# Patient Record
Sex: FEMALE | Race: WHITE | NOT HISPANIC OR LATINO | Employment: FULL TIME | URBAN - METROPOLITAN AREA
[De-identification: names, ages, dates, MRNs, and addresses within clinical notes are randomized per-mention and may not be internally consistent; named-entity substitution may affect disease eponyms.]

---

## 2018-05-30 ENCOUNTER — OFFICE VISIT (OUTPATIENT)
Dept: FAMILY MEDICINE | Facility: CLINIC | Age: 43
End: 2018-05-30
Payer: COMMERCIAL

## 2018-05-30 ENCOUNTER — VIRTUAL VISIT (OUTPATIENT)
Dept: INTERPRETER SERVICES | Facility: CLINIC | Age: 43
End: 2018-05-30

## 2018-05-30 VITALS
BODY MASS INDEX: 36.82 KG/M2 | TEMPERATURE: 98.3 F | HEART RATE: 85 BPM | RESPIRATION RATE: 18 BRPM | HEIGHT: 63 IN | SYSTOLIC BLOOD PRESSURE: 164 MMHG | OXYGEN SATURATION: 99 % | WEIGHT: 207.8 LBS | DIASTOLIC BLOOD PRESSURE: 118 MMHG

## 2018-05-30 DIAGNOSIS — R07.0 THROAT PAIN: Primary | ICD-10-CM

## 2018-05-30 DIAGNOSIS — R03.0 ELEVATED BLOOD PRESSURE READING WITHOUT DIAGNOSIS OF HYPERTENSION: ICD-10-CM

## 2018-05-30 DIAGNOSIS — J02.0 ACUTE STREPTOCOCCAL PHARYNGITIS: ICD-10-CM

## 2018-05-30 DIAGNOSIS — J30.1 ACUTE ALLERGIC RHINITIS DUE TO POLLEN, UNSPECIFIED SEASONALITY: ICD-10-CM

## 2018-05-30 LAB
DEPRECATED S PYO AG THROAT QL EIA: ABNORMAL
SPECIMEN SOURCE: ABNORMAL

## 2018-05-30 PROCEDURE — 99203 OFFICE O/P NEW LOW 30 MIN: CPT | Performed by: FAMILY MEDICINE

## 2018-05-30 PROCEDURE — 87880 STREP A ASSAY W/OPTIC: CPT | Performed by: FAMILY MEDICINE

## 2018-05-30 RX ORDER — FLUTICASONE PROPIONATE 50 MCG
1-2 SPRAY, SUSPENSION (ML) NASAL DAILY
Qty: 1 BOTTLE | Refills: 11 | Status: SHIPPED | OUTPATIENT
Start: 2018-05-30 | End: 2018-12-28

## 2018-05-30 RX ORDER — AMOXICILLIN 500 MG/1
500 CAPSULE ORAL 3 TIMES DAILY
Qty: 30 CAPSULE | Refills: 0 | Status: SHIPPED | OUTPATIENT
Start: 2018-05-30 | End: 2019-02-01

## 2018-05-30 ASSESSMENT — PAIN SCALES - GENERAL: PAINLEVEL: NO PAIN (0)

## 2018-05-30 NOTE — MR AVS SNAPSHOT
"              After Visit Summary   5/30/2018    Charity Guo    MRN: [id number]           Patient Information     Date Of Birth          1975        Visit Information        Provider Department      5/30/2018 3:20 PM Eloise Cm MD; PHONE,  [facility]        Today's Diagnoses     Throat pain    -  1    Elevated blood pressure reading without diagnosis of hypertension        Acute allergic rhinitis due to pollen, unspecified seasonality        Acute streptococcal pharyngitis          Care Instructions      Rinitis alérgica  La rinitis alérgica es alfredo reacción alérgica que afecta la nariz y, con frecuencia, los ojos. Se la suele conocer alex alergias nasales. Es frecuente que las alergias nasales se deban a elementos que están presentes en el medioambiente y se respiran. Según a qué sea sensible, las alergias nasales pueden presentarse únicamente mady ciertas estaciones. O también pueden ocurrir mady todo el año. Los alérgenos comunes de interior incluyen los ácaros del polvo, el moho, las cucarachas y la caspa de las mascotas. Los alérgenos de exterior incluyen el polen de los árboles, los pastos y las hierbas.  Los síntomas son, por ejemplo, goteo nasal, congestión y comezón en la nariz. También incluyen estornudos, ojos rojos y con comezón, y areolas oscuras (\"ojeras alérgicas\") debajo de los ojos. Además, usted puede estar irritable y cansado. Las alergias jens también pueden afectar la respiración y desencadenar alfredo afección llamada asma.  Se pueden realizar pruebas para detectar a qué alérgenos reacciona usted. Es posible que le remitan a un especialista en alergias para que le evalúe y le pete pruebas.  Cuidados en lopez casa  Probablemente el proveedor de atención médica le recete medicamentos para ayudar a aliviar los síntomas de alergia. Siga las instrucciones para jero estos medicamentos. Pueden incluir medicamentos orales, esprays nasales o " "gotas para los ojos.  Pida consejo al proveedor sobre cómo evitar las sustancias a las que es alérgico. Los siguientes son algunos consejos para cada tipo de alérgeno.  Caspa de mascota:    No tenga mascotas con pelaje o plumas.    Si no puede evitar tenerlas, manténgalas fuera del dormitorio y de los muebles tapizados.  Polen:    Cuando el nivel de polen en el ambiente está alto, mantenga cerradas las ventanas de lopez casa y lopez automóvil. De ser posible, use en cambio el aire acondicionado.    Use alfredo mascarilla con filtro cuando kedar el césped o trabaje en el jardín.  Ácaros del polvo en la casa:    Lave la ropa de cama todas las semanas con [place] y detergente, y séquela en un ambiente caliente.    Cubra el colchón, el somier y las almohadas con fundas antialérgicas.    Si es posible, duerma en un cuarto que no tenga tapete, adeola ni muebles tapizados.  Cucarachas:    Guarde la comida en recipientes cerrados herméticamente.    Saque la basura de lopez casa rápidamente.    Arregle las filtraciones de agua.  Moho:    Mantenga bajo el nivel de humedad usando un deshumidificador o el aparato de aire acondicionado. Mantenga el deshumidificador y el aire acondicionado limpios y sin moho.    Limpie áreas que tengan moho con agua y blanqueador.  En general:    Pase la aspiradora alfredo o dos veces por semana. Si es posible, use alfredo aspiradora que tenga un filtro de aire de jose alfredo eficiencia para partículas (\"HEPA\", por matt siglas en inglés).    No fume. Evite el humo de cigarrillo. Fely humo es irritante y puede empeorar los síntomas.  Visitas de control  Programe alfredo visita de control según le indique el proveedor de atención médica o nuestro personal. Si le remitieron a un especialista en alergias, coordine eliza mary sin demoras.  Cuándo debe buscar atención médica  Llame enseguida a lopez proveedor de atención médica si se presenta cualquiera de las siguientes situaciones:    Tos o silbidos al respirar    Fiebre superior " a 100.4  F (38  C)    Urticaria (bultos rojizos)    Continuidad de los síntomas, síntomas nuevos o empeoramiento de los síntomas  Llame al 911 de inmediato si presenta los siguientes síntomas:    Dificultad para respirar    Hinchazón grave de la marco o comezón intensa de los ojos o la boca   Date Last Reviewed: 10/22/2017    1403-3056 [employer]. [address] [place] [id number]. Todos los derechos reservados. Esta información no pretende sustituir la atención médica profesional. Sólo lopez médico puede diagnosticar y tratar un problema de alida.        Controlling High Blood Pressure  High blood pressure (hypertension) is often called the silent killer. This is because many people who have it don t know it. High blood pressure can raise your risk of heart attack, stroke, and heart failure. Controlling your blood pressure can decrease your risk of these problems. Know your blood pressure and remember to check it regularly. Doing so can save your life.  Blood pressure measurements are given as 2 numbers. Systolic blood pressure is the upper number. This is the pressure when the heart contracts. Diastolic blood pressure is the lower number. This is the pressure when the heart relaxes between beats.  Blood pressure is categorized as normal, elevated, or stage 1 or stage 2 high blood pressure:    Normal blood pressure is systolic of less than 120 and diastolic of less than 80 (120/80)    Elevated blood pressure is systolic of 120 to 129 and diastolic less than 80    Stage 1 high blood pressure is systolic is 130 to 139 or diastolic between 80 to 89    Stage 2 high blood pressure is when systolic is 140 or higher or the diastolic is 90 or higher  Here are some things you can do to help control your blood pressure.    Choose heart-healthy foods    Select low-salt, low-fat foods. Limit sodium intake to 2,400 mg per day or the amount suggested by your healthcare provider.    Limit canned, dried, cured,  packaged, and fast foods. These can contain a lot of salt.    Eat 8 to 10 servings of fruits and vegetables every day.    Choose lean meats, fish, or chicken.    Eat whole-grain pasta, brown rice, and beans.    Eat 2 to 3 servings of low-fat or fat-free dairy products.    Ask your doctor about the DASH eating plan. This plan helps reduce blood pressure.    When you go to a restaurant, ask that your meal be prepared with no added salt.  Maintain a healthy weight    Ask your healthcare provider how many calories to eat a day. Then stick to that number.    Ask your healthcare provider what weight range is healthiest for you. If you are overweight, a weight loss of only 3% to 5% of your body weight can help lower blood pressure. Generally, a good weight loss goal is to lose 10% of your body weight in a year.    Limit snacks and sweets.    Get regular exercise.  Get up and get active    Choose activities you enjoy. Find ones you can do with friends or family. This includes bicycling, dancing, walking, and jogging.    Park farther away from building entrances.    Use stairs instead of the elevator.    When you can, walk or bike instead of driving.    [place] leaves, garden, or do household repairs.    Be active at a moderate to vigorous level of physical activity for at least 40 minutes for a minimum of 3 to 4 days a week.   Manage stress    Make time to relax and enjoy life. Find time to laugh.    Communicate your concerns with your loved ones and your healthcare provider.    Visit with family and friends, and keep up with hobbies.  Limit alcohol and quit smoking    Men should have no more than 2 drinks per day.    Women should have no more than 1 drink per day.    Talk with your healthcare provider about quitting smoking. Smoking significantly increases your risk for heart disease and stroke. Ask your healthcare provider about community smoking cessation programs and other options.  Medicines  If lifestyle changes aren t  "enough, your healthcare provider may prescribe high blood pressure medicine. Take all medicines as prescribed. If you have any questions about your medicines, ask your healthcare provider before stopping or changing them.   Date Last Reviewed: 2016-2017 [employer]. [address] [place] [address]. All rights reserved. This information is not intended as a substitute for professional medical care. Always follow your healthcare professional's instructions.                Follow-ups after your visit        Who to contact     If you have questions or need follow up information about today's clinic visit or your schedule please contact [facility] directly at [phone].  Normal or non-critical lab and imaging results will be communicated to you by MyChart, letter or phone within 4 business days after the clinic has received the results. If you do not hear from us within 7 days, please contact the clinic through [employer] or phone. If you have a critical or abnormal lab result, we will notify you by phone as soon as possible.  Submit refill requests through [employer] or call your pharmacy and they will forward the refill request to us. Please allow 3 business days for your refill to be completed.          Additional Information About Your Visit        MyChart Information     [employer] lets you send messages to your doctor, view your test results, renew your prescriptions, schedule appointments and more. To sign up, go to [email or web address] . Click on \"Log in\" on the left side of the screen, which will take you to the Welcome page. Then click on \"Sign up Now\" on the right side of the page.     You will be asked to enter the access code listed below, as well as some personal information. Please follow the directions to create your username and password.     Your access code is: [email or web address]  Expires: 2018  4:12 PM     Your access code will  in 90 days. If " "you need help or a new code, please call your [facility] clinic or [phone].        Care EveryWhere ID     This is your Care EveryWhere ID. This could be used by other organizations to access your [facility] medical records  [id number]        Your Vitals Were     Pulse Temperature Respirations Height Last Period Pulse Oximetry    85 98.3  F (36.8  C) (Oral) 18 5' 2.6\" (1.59 m) 05/23/2018 (Exact Date) 99%    Breastfeeding? BMI (Body Mass Index)                No 37.28 kg/m2           Blood Pressure from Last 3 Encounters:   05/30/18 (!) 164/118    Weight from Last 3 Encounters:   05/30/18 207 lb 12.8 oz (94.3 kg)              We Performed the Following     Rapid strep screen          Today's Medication Changes          These changes are accurate as of 5/30/18  4:12 PM.  If you have any questions, ask your nurse or doctor.               Start taking these medicines.        Dose/Directions    amoxicillin 500 MG capsule   Commonly known as:  AMOXIL   Used for:  Acute streptococcal pharyngitis   Started by:  Eloise Cm MD        Dose:  500 mg   Take 1 capsule (500 mg) by mouth 3 times daily for 10 days   Quantity:  30 capsule   Refills:  0       fluticasone 50 MCG/ACT spray   Commonly known as:  FLONASE   Used for:  Acute allergic rhinitis due to pollen, unspecified seasonality   Started by:  Eloise Cm MD        Dose:  1-2 spray   Spray 1-2 sprays into both nostrils daily   Quantity:  1 Bottle   Refills:  11            Where to get your medicines      These medications were sent to [facility]/pharmacy #[id number] - [place], [place] - [address]  [address] [address]     Phone:  [phone]     amoxicillin 500 MG capsule    fluticasone 50 MCG/ACT spray                Primary Care Provider Fax #    Physician No Ref-Primary [phone]       No address on file        Equal Access to Services     MILADIS COTTON AH: martha Mccain, " deepa olguinalcoty valdezfaith kandacein hayaan adeeg kharash la'aan ah. So [facility] [phone].    ATENCIÓN: Si mayela mccollum, tiene a lopez disposición servicios gratuitos de asistencia lingüística. Rogelio al [phone].    We comply with applicable federal civil rights laws and [place] laws. We do not discriminate on the basis of race, color, national origin, age, disability, sex, sexual orientation, or gender identity.            Thank you!     Thank you for choosing [facility]  for your care. Our goal is always to provide you with excellent care. Hearing back from our patients is one way we can continue to improve our services. Please take a few minutes to complete the written survey that you may receive in the mail after your visit with us. Thank you!             Your Updated Medication List - Protect others around you: Learn how to safely use, store and throw away your medicines at www.disposemymeds.org.          This list is accurate as of 5/30/18  4:12 PM.  Always use your most recent med list.                   Brand Name Dispense Instructions for use Diagnosis    amoxicillin 500 MG capsule    AMOXIL    30 capsule    Take 1 capsule (500 mg) by mouth 3 times daily for 10 days    Acute streptococcal pharyngitis       fluticasone 50 MCG/ACT spray    FLONASE    1 Bottle    Spray 1-2 sprays into both nostrils daily    Acute allergic rhinitis due to pollen, unspecified seasonality

## 2018-05-30 NOTE — LETTER
May 30, 2018      Charity Guo  [address]  [place] [address]        To Whom It May Concern:    Charity Guo  was seen on 5/30/18.  Please excuse her until 6/1/18 due to illness.        Sincerely,        Eloise Stearns MD

## 2018-05-30 NOTE — PROGRESS NOTES
"  SUBJECTIVE:   Charity Guo is a 43 year old female who presents to clinic today for the following health issues:    RESPIRATORY SYMPTOMS      Duration: 6 days    Description  Sore throat, headache, pain in the middle back,pressure on chest, facial pressure, cough-dry cough    Severity: mild, at night its contunious    Accompanying signs and symptoms: chest pressure,fever,chills    History (predisposing factors):  none    Precipitating or alleviating factors: None    Therapies tried and outcome:  rest and fluids       Problem list and histories reviewed & adjusted, as indicated.  Additional history: as documented    Patient Active Problem List   Diagnosis     Elevated blood pressure reading without diagnosis of hypertension     History reviewed. No pertinent surgical history.    Social History   Substance Use Topics     Smoking status: Never Smoker     Smokeless tobacco: Never Used     Alcohol use No     Family History   Problem Relation Age of Onset     Hypertension Mother            Reviewed and updated as needed this visit by clinical staff  Tobacco  Allergies  Meds  Problems  Med Hx  Surg Hx  Fam Hx  Soc Hx        Reviewed and updated as needed this visit by Provider  Tobacco  Allergies  Meds  Problems  Med Hx  Surg Hx  Fam Hx  Soc Hx          ROS:  Constitutional, HEENT, cardiovascular, pulmonary, GI, , musculoskeletal, neuro, skin, endocrine and psych systems are negative, except as otherwise noted.    OBJECTIVE:     BP (!) 164/118 (BP Location: Left arm, Patient Position: Chair, Cuff Size: Adult Large)  Pulse 85  Temp 98.3  F (36.8  C) (Oral)  Resp 18  Ht 5' 2.6\" (1.59 m)  Wt 207 lb 12.8 oz (94.3 kg)  LMP 05/23/2018 (Exact Date)  SpO2 99%  Breastfeeding? No  BMI 37.28 kg/m2  Body mass index is 37.28 kg/(m^2).  GENERAL: healthy, alert and no distress  EYES: Eyes grossly normal to inspection, PERRL and conjunctivae and sclerae normal  HENT: normal cephalic/atraumatic, ear canals and " TM's normal, nasal mucosa edematous , oropharynx clear and oral mucous membranes moist  NECK: no adenopathy, no asymmetry, masses, or scars and thyroid normal to palpation  RESP: lungs clear to auscultation - no rales, rhonchi or wheezes  CV: regular rate and rhythm, normal S1 S2, no S3 or S4, no murmur, click or rub, no peripheral edema and peripheral pulses strong  ABDOMEN: soft, nontender, no hepatosplenomegaly, no masses and bowel sounds normal  MS: no gross musculoskeletal defects noted, no edema  SKIN: no suspicious lesions or rashes  NEURO: Normal strength and tone, mentation intact and speech normal  PSYCH: mentation appears normal, affect normal/bright    Diagnostic Test Results:  Results for orders placed or performed in visit on 05/30/18 (from the past 24 hour(s))   Rapid strep screen   Result Value Ref Range    Specimen Description Throat     Rapid Strep A Screen (A)      POSITIVE: Group A Streptococcal antigen detected by immunoassay.       ASSESSMENT/PLAN:     1. Throat pain    - Rapid strep screen    2. Elevated blood pressure reading without diagnosis of hypertension  Low sodium diet and exercise discussed.  Follow up 1 - 3 months if BP still elevated    3. Acute allergic rhinitis due to pollen, unspecified seasonality  Nasal steroid treatment  - fluticasone (FLONASE) 50 MCG/ACT spray; Spray 1-2 sprays into both nostrils daily  Dispense: 1 Bottle; Refill: 11    4. Acute streptococcal pharyngitis  Oral abx treatment. Work note given  - amoxicillin (AMOXIL) 500 MG capsule; Take 1 capsule (500 mg) by mouth 3 times daily for 10 days  Dispense: 30 capsule; Refill: 0    The uses and side effects, including black box warnings as appropriate, were discussed in detail.  All patient questions were answered.  The patient was instructed to call immediately if any side effects developed.     FUTURE APPOINTMENTS:       - Follow-up visit in 3 days if not improved.    Eloise Stearns MD  [facility]  [facility]

## 2018-05-30 NOTE — PATIENT INSTRUCTIONS
"  Rinitis alérgica  La rinitis alérgica es alfredo reacción alérgica que afecta la nariz y, con frecuencia, los ojos. Se la suele conocer alex alergias nasales. Es frecuente que las alergias nasales se deban a elementos que están presentes en el medioambiente y se respiran. Según a qué sea sensible, las alergias nasales pueden presentarse únicamente mady ciertas estaciones. O también pueden ocurrir mady todo el año. Los alérgenos comunes de interior incluyen los ácaros del polvo, el moho, las cucarachas y la caspa de las mascotas. Los alérgenos de exterior incluyen el polen de los árboles, los pastos y las hierbas.  Los síntomas son, por ejemplo, goteo nasal, congestión y comezón en la nariz. También incluyen estornudos, ojos rojos y con comezón, y areolas oscuras (\"ojeras alérgicas\") debajo de los ojos. Además, usted puede estar irritable y cansado. Las alergias jens también pueden afectar la respiración y desencadenar alfredo afección llamada asma.  Se pueden realizar pruebas para detectar a qué alérgenos reacciona usted. Es posible que le remitan a un especialista en alergias para que le evalúe y le pete pruebas.  Cuidados en lopez casa  Probablemente el proveedor de atención médica le recete medicamentos para ayudar a aliviar los síntomas de alergia. Siga las instrucciones para jero estos medicamentos. Pueden incluir medicamentos orales, esprays nasales o gotas para los ojos.  Pida consejo al proveedor sobre cómo evitar las sustancias a las que es alérgico. Los siguientes son algunos consejos para cada tipo de alérgeno.  Caspa de mascota:    No tenga mascotas con pelaje o plumas.    Si no puede evitar tenerlas, manténgalas fuera del dormitorio y de los muebles tapizados.  Polen:    Cuando el nivel de polen en el ambiente está alto, mantenga cerradas las ventanas de lopez casa y lopez automóvil. De ser posible, use en cambio el aire acondicionado.    Use alfredo mascarilla con filtro cuando kedar el césped o trabaje en el " "jardín.  Ácaros del polvo en la casa:    Lave la ropa de cama todas las semanas con [place] y detergente, y séquela en un ambiente caliente.    Cubra el colchón, el somier y las almohadas con fundas antialérgicas.    Si es posible, duerma en un cuarto que no tenga tapete, adeola ni muebles tapizados.  Cucarachas:    Guarde la comida en recipientes cerrados herméticamente.    Saque la basura de lopez casa rápidamente.    Arregle las filtraciones de agua.  Moho:    Mantenga bajo el nivel de humedad usando un deshumidificador o el aparato de aire acondicionado. Mantenga el deshumidificador y el aire acondicionado limpios y sin moho.    Limpie áreas que tengan moho con agua y blanqueador.  En general:    Pase la aspiradora alfredo o dos veces por semana. Si es posible, use alfredo aspiradora que tenga un filtro de aire de jose alfredo eficiencia para partículas (\"HEPA\", por matt siglas en inglés).    No fume. Evite el humo de cigarrillo. Fely humo es irritante y puede empeorar los síntomas.  Visitas de control  Programe alfredo visita de control según le indique el proveedor de atención médica o nuestro personal. Si le remitieron a un especialista en alergias, coordine eliza mary sin demoras.  Cuándo debe buscar atención médica  Llame enseguida a lopez proveedor de atención médica si se presenta cualquiera de las siguientes situaciones:    Tos o silbidos al respirar    Fiebre superior a 100.4  F (38  C)    Urticaria (bultos rojizos)    Continuidad de los síntomas, síntomas nuevos o empeoramiento de los síntomas  Llame al 911 de inmediato si presenta los siguientes síntomas:    Dificultad para respirar    Hinchazón grave de la marco o comezón intensa de los ojos o la boca   Date Last Reviewed: 10/22/2017    [id number] [employer] [address] [place] [address]. Todos los derechos reservados. Esta información no pretende sustituir la atención médica profesional. Sólo lopez médico puede diagnosticar y tratar un problema de " alida.        Controlling High Blood Pressure  High blood pressure (hypertension) is often called the silent killer. This is because many people who have it don t know it. High blood pressure can raise your risk of heart attack, stroke, and heart failure. Controlling your blood pressure can decrease your risk of these problems. Know your blood pressure and remember to check it regularly. Doing so can save your life.  Blood pressure measurements are given as 2 numbers. Systolic blood pressure is the upper number. This is the pressure when the heart contracts. Diastolic blood pressure is the lower number. This is the pressure when the heart relaxes between beats.  Blood pressure is categorized as normal, elevated, or stage 1 or stage 2 high blood pressure:    Normal blood pressure is systolic of less than 120 and diastolic of less than 80 (120/80)    Elevated blood pressure is systolic of 120 to 129 and diastolic less than 80    Stage 1 high blood pressure is systolic is 130 to 139 or diastolic between 80 to 89    Stage 2 high blood pressure is when systolic is 140 or higher or the diastolic is 90 or higher  Here are some things you can do to help control your blood pressure.    Choose heart-healthy foods    Select low-salt, low-fat foods. Limit sodium intake to 2,400 mg per day or the amount suggested by your healthcare provider.    Limit canned, dried, cured, packaged, and fast foods. These can contain a lot of salt.    Eat 8 to 10 servings of fruits and vegetables every day.    Choose lean meats, fish, or chicken.    Eat whole-grain pasta, brown rice, and beans.    Eat 2 to 3 servings of low-fat or fat-free dairy products.    Ask your doctor about the DASH eating plan. This plan helps reduce blood pressure.    When you go to a restaurant, ask that your meal be prepared with no added salt.  Maintain a healthy weight    Ask your healthcare provider how many calories to eat a day. Then stick to that number.    Ask  your healthcare provider what weight range is healthiest for you. If you are overweight, a weight loss of only 3% to 5% of your body weight can help lower blood pressure. Generally, a good weight loss goal is to lose 10% of your body weight in a year.    Limit snacks and sweets.    Get regular exercise.  Get up and get active    Choose activities you enjoy. Find ones you can do with friends or family. This includes bicycling, dancing, walking, and jogging.    Park farther away from building entrances.    Use stairs instead of the elevator.    When you can, walk or bike instead of driving.    [place] leaves, garden, or do household repairs.    Be active at a moderate to vigorous level of physical activity for at least 40 minutes for a minimum of 3 to 4 days a week.   Manage stress    Make time to relax and enjoy life. Find time to laugh.    Communicate your concerns with your loved ones and your healthcare provider.    Visit with family and friends, and keep up with hobbies.  Limit alcohol and quit smoking    Men should have no more than 2 drinks per day.    Women should have no more than 1 drink per day.    Talk with your healthcare provider about quitting smoking. Smoking significantly increases your risk for heart disease and stroke. Ask your healthcare provider about community smoking cessation programs and other options.  Medicines  If lifestyle changes aren t enough, your healthcare provider may prescribe high blood pressure medicine. Take all medicines as prescribed. If you have any questions about your medicines, ask your healthcare provider before stopping or changing them.   Date Last Reviewed: 4/27/2016 2000-2017 [employer]. [address] [facility], [place] [address]. All rights reserved. This information is not intended as a substitute for professional medical care. Always follow your healthcare professional's instructions.

## 2018-06-15 ENCOUNTER — OFFICE VISIT (OUTPATIENT)
Dept: FAMILY MEDICINE | Facility: CLINIC | Age: 43
End: 2018-06-15
Payer: COMMERCIAL

## 2018-06-15 VITALS
HEART RATE: 80 BPM | BODY MASS INDEX: 37.68 KG/M2 | TEMPERATURE: 98.7 F | DIASTOLIC BLOOD PRESSURE: 80 MMHG | WEIGHT: 210 LBS | SYSTOLIC BLOOD PRESSURE: 131 MMHG | OXYGEN SATURATION: 100 %

## 2018-06-15 DIAGNOSIS — Z87.59 HISTORY OF SPONTANEOUS ABORTION: ICD-10-CM

## 2018-06-15 DIAGNOSIS — Z32.01 PREGNANCY TEST POSITIVE: ICD-10-CM

## 2018-06-15 DIAGNOSIS — Z12.31 ENCOUNTER FOR SCREENING MAMMOGRAM FOR BREAST CANCER: ICD-10-CM

## 2018-06-15 DIAGNOSIS — H53.2 DOUBLE VISION: Primary | ICD-10-CM

## 2018-06-15 LAB
BETA HCG QUAL IFA URINE: POSITIVE
ERYTHROCYTE [DISTWIDTH] IN BLOOD BY AUTOMATED COUNT: 15.5 % (ref 10–15)
GLUCOSE SERPL-MCNC: 107 MG/DL (ref 70–99)
HBA1C MFR BLD: 5 % (ref 0–5.6)
HCT VFR BLD AUTO: 34.7 % (ref 35–47)
HGB BLD-MCNC: 11.1 G/DL (ref 11.7–15.7)
MCH RBC QN AUTO: 26.2 PG (ref 26.5–33)
MCHC RBC AUTO-ENTMCNC: 32 G/DL (ref 31.5–36.5)
MCV RBC AUTO: 82 FL (ref 78–100)
PLATELET # BLD AUTO: 304 10E9/L (ref 150–450)
RBC # BLD AUTO: 4.24 10E12/L (ref 3.8–5.2)
TSH SERPL DL<=0.005 MIU/L-ACNC: 1.35 MU/L (ref 0.4–4)
WBC # BLD AUTO: 7.9 10E9/L (ref 4–11)

## 2018-06-15 PROCEDURE — 99214 OFFICE O/P EST MOD 30 MIN: CPT | Performed by: NURSE PRACTITIONER

## 2018-06-15 PROCEDURE — 85027 COMPLETE CBC AUTOMATED: CPT | Performed by: NURSE PRACTITIONER

## 2018-06-15 PROCEDURE — 86038 ANTINUCLEAR ANTIBODIES: CPT | Performed by: NURSE PRACTITIONER

## 2018-06-15 PROCEDURE — 82947 ASSAY GLUCOSE BLOOD QUANT: CPT | Performed by: NURSE PRACTITIONER

## 2018-06-15 PROCEDURE — 84703 CHORIONIC GONADOTROPIN ASSAY: CPT | Performed by: NURSE PRACTITIONER

## 2018-06-15 PROCEDURE — 36415 COLL VENOUS BLD VENIPUNCTURE: CPT | Performed by: NURSE PRACTITIONER

## 2018-06-15 PROCEDURE — 84443 ASSAY THYROID STIM HORMONE: CPT | Performed by: NURSE PRACTITIONER

## 2018-06-15 PROCEDURE — 83036 HEMOGLOBIN GLYCOSYLATED A1C: CPT | Performed by: NURSE PRACTITIONER

## 2018-06-15 ASSESSMENT — PAIN SCALES - GENERAL: PAINLEVEL: NO PAIN (0)

## 2018-06-15 NOTE — MR AVS SNAPSHOT
After Visit Summary   6/15/2018    Charity Guo    MRN: [id number]           Patient Information     Date Of Birth          1975        Visit Information        Provider Department      6/15/2018 3:25 PM Open, [place]; Meagan Underwood APRN CNP [facility]        Today's Diagnoses     Double vision    -  1    Encounter for screening mammogram for breast cancer          Care Instructions    Please make appointment with Neurology and with Opthalmology.    We will contact you about blood work when it is back.    Call [phone] to schedule mammogram           Follow-ups after your visit        Additional Services     NEUROLOGY ADULT REFERRAL       Your provider has referred you for the following:   Consult at [facility]: [facility] [phone]   [email or web address]  [facility]: [facility] [phone]   [email or web address]-[email or web address]/    Please be aware that coverage of these services is subject to the terms and limitations of your health insurance plan.  Call member services at your health plan with any benefit or coverage questions.      Please bring the following with you to your appointment:    (1) Any X-Rays, CTs or MRIs which have been performed.  Contact the facility where they were done to arrange for  prior to your scheduled appointment.    (2) List of current medications  (3) This referral request   (4) Any documents/labs given to you for this referral            OPHTHALMOLOGY ADULT REFERRAL       Your provider has referred you to: [facility]: [facility] [phone]   [email or web address]/  [facility]: [facility] [phone]   [email or web address].[email or web address]-[email or web address]/    Please be aware that coverage of these services is subject  "to the terms and limitations of your health insurance plan.  Call member services at your health plan with any benefit or coverage questions.      Please bring the following with you to your appointment:    (1) Any X-Rays, CTs or MRIs which have been performed.  Contact the facility where they were done to arrange for  prior to your scheduled appointment.    (2) List of current medications  (3) This referral request   (4) Any documents/labs given to you for this referral                  Future tests that were ordered for you today     Open Future Orders        Priority Expected Expires Ordered    *MA Screening Digital Bilateral Routine  6/15/2019 6/15/2018            Who to contact     If you have questions or need follow up information about today's clinic visit or your schedule please contact [facility] directly at [phone].  Normal or non-critical lab and imaging results will be communicated to you by MyChart, letter or phone within 4 business days after the clinic has received the results. If you do not hear from us within 7 days, please contact the clinic through MyChart or phone. If you have a critical or abnormal lab result, we will notify you by phone as soon as possible.  Submit refill requests through [employer] or call your pharmacy and they will forward the refill request to us. Please allow 3 business days for your refill to be completed.          Additional Information About Your Visit        [employer] Information     [facility] lets you send messages to your doctor, view your test results, renew your prescriptions, schedule appointments and more. To sign up, go to [email or web address] . Click on \"Log in\" on the left side of the screen, which will take you to the Welcome page. Then click on \"Sign up Now\" on the right side of the page.     You will be asked to enter the access code listed below, as well as some personal information. Please follow the directions to create your " username and password.     Your access code is: [email or web address]  Expires: 2018  4:12 PM     Your access code will  in 90 days. If you need help or a new code, please call your [facility] or [phone].        Care EveryWhere ID     This is your Care EveryWhere ID. This could be used by other organizations to access your [facility] medical records  [id number]        Your Vitals Were     Pulse Temperature Last Period Pulse Oximetry BMI (Body Mass Index)       80 98.7  F (37.1  C) (Oral) 2018 (Exact Date) 100% 37.68 kg/m2        Blood Pressure from Last 3 Encounters:   06/15/18 131/80   18 (!) 164/118    Weight from Last 3 Encounters:   06/15/18 210 lb (95.3 kg)   18 207 lb 12.8 oz (94.3 kg)              We Performed the Following     Anti Nuclear Daylin IgG by IFA with Reflex     Beta HCG Qual, Urine - [facility] and [place] ([id number])     CBC with platelets     Glucose     Hemoglobin A1c     NEUROLOGY ADULT REFERRAL     OPHTHALMOLOGY ADULT REFERRAL     TSH with free T4 reflex        Primary Care Provider Fax #    Physician No Ref-Primary [phone]       No address on file        Equal Access to Services     MILADIS COTTON : Hadii travis ku hadasho Soomaali, waaxda luqadaha, qaybta kaalmada adeegyada, waxay kandacein hayblake pink . So [facility] [phone].    ATENCIÓN: Si habla español, tiene a lopez disposición servicios gratuitos de asistencia lingüística. Llame al [phone].    We comply with applicable federal civil rights laws and [place] laws. We do not discriminate on the basis of race, color, national origin, age, disability, sex, sexual orientation, or gender identity.            Thank you!     Thank you for choosing [facility]  for your care. Our goal is always to provide you with excellent care. Hearing back from our patients is one way we can continue to improve our services. Please take a few minutes to complete the written survey that you may receive  in the mail after your visit with us. Thank you!             Your Updated Medication List - Protect others around you: Learn how to safely use, store and throw away your medicines at www.disposemymeds.org.          This list is accurate as of 6/15/18  4:03 PM.  Always use your most recent med list.                   Brand Name Dispense Instructions for use Diagnosis    fluticasone 50 MCG/ACT spray    FLONASE    1 Bottle    Spray 1-2 sprays into both nostrils daily    Acute allergic rhinitis due to pollen, unspecified seasonality
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
## 2018-06-15 NOTE — PATIENT INSTRUCTIONS
Please make appointment with Neurology and with Opthalmology.    We will contact you about blood work when it is back.    Call [phone] to schedule mammogram

## 2018-06-18 PROBLEM — Z32.01 PREGNANCY TEST POSITIVE: Status: ACTIVE | Noted: 2018-06-18

## 2018-06-18 PROBLEM — Z87.59 HISTORY OF SPONTANEOUS ABORTION: Status: ACTIVE | Noted: 2018-06-18

## 2018-06-18 PROBLEM — H53.2 DOUBLE VISION: Status: ACTIVE | Noted: 2018-06-18

## 2018-06-19 LAB — ANA SER QL IF: NEGATIVE

## 2018-07-06 ENCOUNTER — OFFICE VISIT (OUTPATIENT)
Dept: OPTOMETRY | Facility: CLINIC | Age: 43
End: 2018-07-06
Attending: NURSE PRACTITIONER
Payer: COMMERCIAL

## 2018-07-06 DIAGNOSIS — H52.02 HYPERMETROPIA OF LEFT EYE: ICD-10-CM

## 2018-07-06 DIAGNOSIS — H53.2 MONOCULAR DIPLOPIA OF LEFT EYE: Primary | ICD-10-CM

## 2018-07-06 PROCEDURE — 92015 DETERMINE REFRACTIVE STATE: CPT | Performed by: OPTOMETRIST

## 2018-07-06 PROCEDURE — 92004 COMPRE OPH EXAM NEW PT 1/>: CPT | Performed by: OPTOMETRIST

## 2018-07-06 ASSESSMENT — TONOMETRY
OD_IOP_MMHG: 18
IOP_METHOD: TONOPEN
OS_IOP_MMHG: 17

## 2018-07-06 ASSESSMENT — REFRACTION_MANIFEST
OS_AXIS: 090
OS_SPHERE: PLANO
OS_CYLINDER: +0.50
OD_SPHERE: PLANO
OD_CYLINDER: SPHERE

## 2018-07-06 ASSESSMENT — VISUAL ACUITY
OS_SC: 20/20
METHOD: SNELLEN - LINEAR
OD_SC: 20/20

## 2018-07-06 ASSESSMENT — CUP TO DISC RATIO
OD_RATIO: 0.3
OS_RATIO: 0.3

## 2018-07-06 ASSESSMENT — REFRACTION
OS_SPHERE: +0.25
OD_SPHERE: PLANO

## 2018-07-06 ASSESSMENT — CONF VISUAL FIELD
OS_NORMAL: 1
OD_NORMAL: 1
METHOD: COUNTING FINGERS

## 2018-07-06 ASSESSMENT — EXTERNAL EXAM - LEFT EYE: OS_EXAM: NORMAL

## 2018-07-06 ASSESSMENT — SLIT LAMP EXAM - LIDS
COMMENTS: NORMAL
COMMENTS: NORMAL

## 2018-07-06 ASSESSMENT — EXTERNAL EXAM - RIGHT EYE: OD_EXAM: NORMAL
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
## 2018-07-06 NOTE — MR AVS SNAPSHOT
After Visit Summary   7/6/2018    Charity Guo    MRN: [id number]           Patient Information     Date Of Birth          1975        Visit Information        Provider Department      7/6/2018 2:15 PM Arturo Barros, OD; [facility]        Today's Diagnoses     Monocular diplopia of left eye    -  1    Hypermetropia of left eye          Care Instructions    Your eyes are healthy.    Systane Ultra 1 drop left eye 4 x day.    Return in 2 weeks for recheck or sooner if needed.    Arturo Barros, OD    The affects of the dilating drops last for 4- 6 hours.  You will be more sensitive to light and vision will be blurry up close.  Mydriatic sunglasses were given if needed.      Optometry Providers       Clinic Locations                                 Telephone Number   Dr. Margarita Mclean   [facility]   [place] [phone]     [facility] Hours:                [facility] Hours:       [facility] Hours:   [address]   [address] [facility]     [address] [place] [address]   [place] [address]    [place] [address]  Phone: [phone]                    Phone: [phone]     Phone: [phone]                      Monday 8:00-7:00                          Monday 8:00-7:00                          Monday 8:00-7:00              Tuesday 8:00-6:00                          Tuesday 8:00-7:00                          Tuesday 8:00-7:00              Wednesday 8:00-6:00                  Wednesday 8:00-7:00                   Wednesday 8:00-7:00      Thursday 8:00-6:00                        Thursday 8:00-7:00                         Thursday 8:00-7:00            Friday 8:00-5:00                              Friday 8:00-5:00                              Friday 8:00-5:00    [facility] Hours:   [address]  [facility] [place] [address]  [phone]    Monday 8:00-7:00  Tuesday 8:00-7:00  Wednesday 8:00-7:00  Thursday 8:00-7:00  Friday 8:00-5:00  Please log on to [email or web address] to order your contact lenses.  The link is found on the Eye Care and Vision Services page.  As always, Thank you for trusting us with your health care needs!            Follow-ups after your visit        Follow-up notes from your care team     Return in about 2 weeks (around 7/20/2018), or if symptoms worsen or fail to improve, for Follow Up.      Your next 10 appointments already scheduled     Jul 17, 2018  1:30 PM CDT   [facility] Visit [facility] Justyn [facility]le MD   [facility]    [address] [place] [id number] [phone]            Jul 27, 2018  2:20 PM CDT   Return Visit with Arturo Barros OD   [facility] ([facility]    [address] [place] [address] [id number] [phone]              Who to contact     If you have questions or need follow up information about today's clinic visit or your schedule please contact [facility] directly at [phone].  Normal or non-critical lab and imaging results will be communicated to you by [employer], letter or phone within 4 business days after the clinic has received the results. If you do not hear from us within 7 days, please contact the clinic through [employer] or phone. If you have a critical or abnormal lab result, we will notify you by phone as soon as possible.  Submit refill requests through "[employer]" or call your pharmacy and they will forward the refill request to us. Please allow 3 business days for your refill to be completed.          Additional Information About Your Visit        [employer] Information     "[employer]" is an electronic gateway that provides easy, online access to your medical records. With "[employer]", you can request a clinic appointment, read your test results,  renew a prescription or communicate with your care team.     To sign up for MyChart visit the website at [email or web address]   You will be asked to enter the access code listed below, as well as some personal information. Please follow the directions to create your username and password.     Your access code is: [email or web address]  Expires: 2018  4:12 PM     Your access code will  in 90 days. If you need help or a new code, please contact your [facility] or call [phone] for assistance.        Care EveryWhere ID     This is your Care EveryWhere ID. This could be used by other organizations to access your [place] medical records  [id number]         Blood Pressure from Last 3 Encounters:   06/15/18 131/80   18 (!) 164/118    Weight from Last 3 Encounters:   06/15/18 95.3 kg (210 lb)   18 94.3 kg (207 lb 12.8 oz)              We Performed the Following     EYE EXAM (SIMPLE-NONBILLABLE)     REFRACTION        Primary Care Provider Fax #    Physician No Ref-Primary [phone]       No address on file        Equal Access to Services     MILADIS COTTON : Hadii aad ku hadasho Somatthewali, waaxda luqadaha, qaybta kaalmada adeegyada, lindsay mayfield. So [facility] [phone].    ATENCIÓN: Si habla español, tiene a lopez disposición servicios gratuitos de asistencia lingüística. Llame al [phone].    We comply with applicable federal civil rights laws and [place] laws. We do not discriminate on the basis of race, color, national origin, age, disability, sex, sexual orientation, or gender identity.            Thank you!     Thank you for choosing [facility]  for your care. Our goal is always to provide you with excellent care. Hearing back from our patients is one way we can continue to improve our services. Please take a few minutes to complete the written survey that you may receive in the mail after your visit with us. Thank  you!             Your Updated Medication List - Protect others around you: Learn how to safely use, store and throw away your medicines at www.disposemymeds.org.          This list is accurate as of 7/6/18 11:59 PM.  Always use your most recent med list.                   Brand Name Dispense Instructions for use Diagnosis    fluticasone 50 MCG/ACT spray    FLONASE    1 Bottle    Spray 1-2 sprays into both nostrils daily    Acute allergic rhinitis due to pollen, unspecified seasonality

## 2018-07-09 PROBLEM — H53.2 DOUBLE VISION: Status: RESOLVED | Noted: 2018-06-18 | Resolved: 2018-07-09

## 2018-07-09 PROBLEM — H53.2 MONOCULAR DIPLOPIA OF LEFT EYE: Status: ACTIVE | Noted: 2018-07-09

## 2018-07-09 NOTE — PATIENT INSTRUCTIONS
Your eyes are healthy.    Systane Ultra 1 drop left eye 4 x day.    Return in 2 weeks for recheck or sooner if needed.    Arturo Barros, CHAD    The affects of the dilating drops last for 4- 6 hours.  You will be more sensitive to light and vision will be blurry up close.  Mydriatic sunglasses were given if needed.      Optometry Providers       Clinic Locations                                 Telephone Number   Dr. Margarita Mitchell [facility] [place]   [place] [phone]     [facility] Hours:                [place] [facility] Hours:       [place] [facility] Hours:   [address] [facility] [address]   [address] [facility]     [address] [place] [address]   [place] [address]    [place] [address]  Phone: [phone]                    Phone: [phone]     Phone: [phone]                      Monday 8:00-7:00                          Monday 8:00-7:00                          Monday 8:00-7:00              Tuesday 8:00-6:00                          Tuesday 8:00-7:00                          Tuesday 8:00-7:00              Wednesday 8:00-6:00                  Wednesday 8:00-7:00                   Wednesday 8:00-7:00      Thursday 8:00-6:00                        Thursday 8:00-7:00                         Thursday 8:00-7:00            Friday 8:00-5:00                              Friday 8:00-5:00                              Friday 8:00-5:00    [facility] Hours:   [address] [facility] [address] [place] [address]  [phone]    Monday 8:00-7:00  Tuesday 8:00-7:00  Wednesday 8:00-7:00  Thursday 8:00-7:00  Friday 8:00-5:00  Please log on to [email or web address] to order your contact lenses.  The link is found on the Eye Care and Vision Services page.  As always, Thank you for trusting us with your health care needs!

## 2018-07-13 ENCOUNTER — PRE VISIT (OUTPATIENT)
Dept: NEUROLOGY | Facility: CLINIC | Age: 43
End: 2018-07-13

## 2018-07-13 NOTE — TELEPHONE ENCOUNTER
PREVISIT INFORMATION                                                    Charity Guo scheduled for future visit at [facility] specialty clinics.    Patient is scheduled to see Dr. Calle on 7/17  Reason for visit: double vision  Referring provider Meagan Underwood  Has patient seen previous specialist? No  Medical Records:  Available in chart.  Patient was previously seen at a [place] or [facility] facility.     REVIEW                                                      New patient packet mailed to patient: Yes  Medication reconciliation complete: Yes      Current Outpatient Prescriptions   Medication Sig Dispense Refill     fluticasone (FLONASE) 50 MCG/ACT spray Spray 1-2 sprays into both nostrils daily 1 Bottle 11       Allergies: Review of patient's allergies indicates no known allergies.        PLAN/FOLLOW-UP NEEDED                                                      Previsit review complete.  Patient will see provider at future scheduled appointment.     Patient Reminders Given:  Please, make sure you bring an updated list of your medications.   If you are having a procedure, please, present 15 minutes early.  If you need to cancel or reschedule,please call [phone].    Yeni Ignacio

## 2018-07-17 ENCOUNTER — TELEPHONE (OUTPATIENT)
Dept: NEUROLOGY | Facility: CLINIC | Age: 43
End: 2018-07-17

## 2018-07-17 ENCOUNTER — TELEPHONE (OUTPATIENT)
Dept: FAMILY MEDICINE | Facility: CLINIC | Age: 43
End: 2018-07-17

## 2018-07-17 ENCOUNTER — OFFICE VISIT (OUTPATIENT)
Dept: NEUROLOGY | Facility: CLINIC | Age: 43
End: 2018-07-17
Attending: NURSE PRACTITIONER
Payer: COMMERCIAL

## 2018-07-17 VITALS
BODY MASS INDEX: 37.66 KG/M2 | HEART RATE: 84 BPM | SYSTOLIC BLOOD PRESSURE: 121 MMHG | OXYGEN SATURATION: 98 % | DIASTOLIC BLOOD PRESSURE: 84 MMHG | WEIGHT: 209.9 LBS

## 2018-07-17 DIAGNOSIS — H53.2 DIPLOPIA: Primary | ICD-10-CM

## 2018-07-17 DIAGNOSIS — Z32.01 PREGNANCY TEST POSITIVE: Primary | ICD-10-CM

## 2018-07-17 DIAGNOSIS — E05.90 SUBCLINICAL HYPERTHYROIDISM: ICD-10-CM

## 2018-07-17 LAB
T4 FREE SERPL-MCNC: 1.4 NG/DL (ref 0.76–1.46)
TSH SERPL DL<=0.005 MIU/L-ACNC: 0.19 MU/L (ref 0.4–4)

## 2018-07-17 PROCEDURE — 84443 ASSAY THYROID STIM HORMONE: CPT | Performed by: PSYCHIATRY & NEUROLOGY

## 2018-07-17 PROCEDURE — 99204 OFFICE O/P NEW MOD 45 MIN: CPT | Performed by: PSYCHIATRY & NEUROLOGY

## 2018-07-17 PROCEDURE — 84439 ASSAY OF FREE THYROXINE: CPT | Performed by: PSYCHIATRY & NEUROLOGY

## 2018-07-17 PROCEDURE — 36415 COLL VENOUS BLD VENIPUNCTURE: CPT | Performed by: PSYCHIATRY & NEUROLOGY

## 2018-07-17 ASSESSMENT — PAIN SCALES - GENERAL: PAINLEVEL: NO PAIN (0)

## 2018-07-17 NOTE — TELEPHONE ENCOUNTER
Hi there!  Please call patient (via ).  I saw her last month and she was found to have a positive pregnancy test.  She has not yet scheduled with OB.  I would recommend she be seen in the next 2 weeks or so.  Please have her call or help her to schedule with Dr. Long.  Otherwise, she can continue with the plan to follow up with neurophthalmology as she discussed with Dr. Calle (neurologist) today.  The thyroid testing he did can be repeated in a month.  Nothing to do about it right now.  Not the cause of her vision changes.  Thank you!  CHELSY Josue CNP

## 2018-07-17 NOTE — PROGRESS NOTES
Visit Date:   07/17/2018      PATIENT REFERRED BY:  CHELSY Pinto, JASON.        HISTORY OF PRESENT ILLNESS:  This patient is a 44-year-old right-handed woman seen at the request of primary care for a neurologic consultation with chief complaint of double vision. She was in clinic today with the , Oneil. She reports that she has vertical double vision.  It is present in each eye individually but worse on the left than on the right.  It began a month or so ago.  It has not really evolved since that time.  It does not fluctuate.  It is present all day every day.  When she reads, she notices blurring of the words.  She does not have double vision when she reads.  When she looks out while driving then she will see vertical diplopia including of the road and of other objects.  She does not have any eye pain.  She has no issues with her hearing, speech or swallowing.  She does not have focal symptoms in her arms or legs such as numbness, tingling, pins or needles.  She has no problem with weakness or with her walking.  Her balance is good.  She has no issues with bowel or bladder control.      PAST MEDICAL HISTORY:  Negative for diabetes, thyroid, asthma or high blood pressure.  She is not on chronic medications.  She thinks she is pregnant.  She does not have pertinent surgical or trauma history.  She does not drink or smoke.      SOCIAL HISTORY:  She is  with 3 children and works as a .      FAMILY HISTORY:  Noncontributory.      PHYSICAL EXAMINATION:   GENERAL:  The patient is cooperative and in no distress.   VITAL SIGNS:  Her blood pressure is 121/84.   NECK:  There are no carotid bruits but auscultation of the heart shows S1, S2, without murmur, rub or gallop.   CHEST:  Clear to auscultation.   NEUROLOGIC:  The patient is alert, oriented and lucid.  Cranial nerve testing shows full visual fields to confrontation.  Funduscopic exam shows sharp discs bilaterally.  Visual acuity is  20/20 bilaterally.  Eye movements are complete and conjugate without nystagmus.  This includes vertical eye movements.  Convergence is preserved.  Pupils react to light.  Facies are symmetric.  Facial sensation is symmetric.  Tongue protrudes in the midline.  Motor evaluation shows no pronator drift, normal finger tapping, finger-nose-finger and heel-knee-shin.  She has good strength in the arms and legs.  Muscle stretch reflexes are low amplitude and symmetric.  Toes are downgoing.  Sensory exam shows preserved vibration and temperature.  Romberg sign is absent.  She can walk on her heels, toes and tandem.      ASSESSMENT:  Monocular diplopia, right greater than left.      DISCUSSION:  This patient is seen for evaluation of double vision.  By history, it appears to be monocular diplopia in each eye, with the symptoms more prominent in the left eye alone than in the right eye.  This would suggest more of an ocular type of problem.  She did see optometry but no recommendations were made by her report.  I do not find any abnormalities on exam today.  I am going to obtain an MRI scan of the brain to rule out any structural or inflammatory lesions that could be a factor.  I am going to check a thyroid panel.  I am going to refer her to neurophthalmology to see if they have any other recommendations for evaluation and diagnosis.  I will communicate the results of the MRI to her when available.  It would have to be through an .           SCOTTY IVY MD             D: 2018   T: 2018   MT: TOMA      Name:     ALEXANDER SAENZ   MRN:      [id number]        Account:      [id number]   :      1975           Visit Date:   2018      Document: [id number]

## 2018-07-17 NOTE — TELEPHONE ENCOUNTER
Justyn Calle MD  P [facility] [place]                     Must go through . Please report to pt that thyroid is slightly off on one test and normal on the other.  Will forward to primary care for suggestions.  Please send the lab to Meagan Underwood CNP.       Called pt via  and communicated results. Pt verbalized understanding. Yeni Ignacio RN

## 2018-07-17 NOTE — MR AVS SNAPSHOT
After Visit Summary   7/17/2018    Charity Guo    MRN: [id number]           Patient Information     Date Of Birth          1975        Visit Information        Provider Department      7/17/2018 1:15 PM Justyn Calle MD; [facility] [employer] [facility]        Today's Diagnoses     Diplopia    -  1       Follow-ups after your visit        Additional Services     OPHTHALMOLOGY ADULT REFERRAL       Your provider has referred you to: [facility]: Eye Clinic [facility] [phone]   [email or web address] or [place]    Please be aware that coverage of these services is subject to the terms and limitations of your health insurance plan.  Call member services at your health plan with any benefit or coverage questions.      Please bring the following with you to your appointment:    (1) Any X-Rays, CTs or MRIs which have been performed.  Contact the facility where they were done to arrange for  prior to your scheduled appointment.    (2) List of current medications  (3) This referral request   (4) Any documents/labs given to you for this referral                  Follow-up notes from your care team     Return if symptoms worsen or fail to improve.      Your next 10 appointments already scheduled     Jul 24, 2018  2:00 PM CDT   MR BRAIN W/O CONTRAST with MGMR1   [facility]    [address] [place] [id number] [phone]           Take your medicines as usual, unless your doctor tells you not to. Bring a list of your current medicines to your exam (including vitamins, minerals and over-the-counter drugs). Also bring the results of similar scans you may have had.  Please remove any body piercings and hair extensions before you arrive.  Follow your doctor s orders. If you do not, we may have to postpone your exam.  You may or may not receive IV contrast  for this exam pending the discretion of the Radiologist.  You do not need to do anything special to prepare.  The MRI machine uses a strong magnet. Please wear clothes without metal (snaps, zippers). A sweatsuit works well, or we may give you a hospital gown.   **IMPORTANT** THE INSTRUCTIONS BELOW ARE ONLY FOR THOSE PATIENTS WHO HAVE BEEN PRESCRIBED SEDATION OR GENERAL ANESTHESIA DURING THEIR MRI PROCEDURE:  IF YOUR DOCTOR PRESCRIBED ORAL SEDATION (take medicine to help you relax during your exam):   You must get the medicine from your doctor (oral medication) before you arrive. Bring the medicine to the exam. Do not take it at home. You ll be told when to take it upon arriving for your exam.   Arrive one hour early. Bring someone who can take you home after the test. Your medicine will make you sleepy. After the exam, you may not drive, take a bus or take a taxi by yourself.  IF YOUR DOCTOR PRESCRIBED IV SEDATION:   Arrive one hour early. Bring someone who can take you home after the test. Your medicine will make you sleepy. After the exam, you may not drive, take a bus or take a taxi by yourself.   No eating 6 hours before your exam. You may have clear liquids up until 4 hours before your exam. (Clear liquids include water, clear tea, black coffee and fruit juice without pulp.)  IF YOUR DOCTOR PRESCRIBED ANESTHESIA (be asleep for your exam):   Arrive 1 1/2 hours early. Bring someone who can take you home after the test. You may not drive, take a bus or take a taxi by yourself.   No eating 8 hours before your exam. You may have clear liquids up until 4 hours before your exam. (Clear liquids include water, clear tea, black coffee and fruit juice without pulp.)   You will spend four to five hours in the recovery room.  Please call the Imaging Department at your exam site with any questions.            Jul 27, 2018  2:20 PM CDT   Return Visit with Arturo Barros OD   [facility] ([facility]  [facility]    [address] [address] [place] [id number] [phone]              Future tests that were ordered for you today     Open Future Orders        Priority Expected Expires Ordered    MR Brain w/o Contrast Routine  2019            Who to contact     If you have questions or need follow up information about today's clinic visit or your schedule please contact [facility] directly at [phone].  Normal or non-critical lab and imaging results will be communicated to you by [employer], letter or phone within 4 business days after the clinic has received the results. If you do not hear from us within 7 days, please contact the clinic through [employer] or phone. If you have a critical or abnormal lab result, we will notify you by phone as soon as possible.  Submit refill requests through [employer] or call your pharmacy and they will forward the refill request to us. Please allow 3 business days for your refill to be completed.          Additional Information About Your Visit        [employer] Information     [employer] is an electronic gateway that provides easy, online access to your medical records. With [employer], you can request a clinic appointment, read your test results, renew a prescription or communicate with your care team.     To sign up for [employer] visit the website at [email or web address]   You will be asked to enter the access code listed below, as well as some personal information. Please follow the directions to create your username and password.     Your access code is: [email or web address]  Expires: 2018  4:12 PM     Your access code will  in 90 days. If you need help or a new code, please contact your [facility] or call [phone] for assistance.        Care EveryWhere ID     This is your Care EveryWhere ID. This could be used by other organizations to access your [place] medical records  [id number]        Your Vitals Were      Pulse Pulse Oximetry BMI (Body Mass Index)             84 98% 37.66 kg/m2          Blood Pressure from Last 3 Encounters:   07/17/18 121/84   06/15/18 131/80   05/30/18 (!) 164/118    Weight from Last 3 Encounters:   07/17/18 95.2 kg (209 lb 14.4 oz)   06/15/18 95.3 kg (210 lb)   05/30/18 94.3 kg (207 lb 12.8 oz)              We Performed the Following     OPHTHALMOLOGY ADULT REFERRAL     T4, free     TSH        Primary Care Provider Fax #    Physician No Ref-Primary [phone]       No address on file        Equal Access to Services     [facility] CenterLISA : Hadii travis Guzmán, martha frances, deepa hubermagabriele lazaro, lindsay pink . So [facility] [phone].    ATENCIÓN: Si habla español, tiene a lopez disposición servicios gratuitos de asistencia lingüística. Llame al [phone].    We comply with applicable federal civil rights laws and [place] laws. We do not discriminate on the basis of race, color, national origin, age, disability, sex, sexual orientation, or gender identity.            Thank you!     Thank you for choosing [facility]  for your care. Our goal is always to provide you with excellent care. Hearing back from our patients is one way we can continue to improve our services. Please take a few minutes to complete the written survey that you may receive in the mail after your visit with us. Thank you!             Your Updated Medication List - Protect others around you: Learn how to safely use, store and throw away your medicines at www.disposemymeds.org.          This list is accurate as of 7/17/18  1:55 PM.  Always use your most recent med list.                   Brand Name Dispense Instructions for use Diagnosis    fluticasone 50 MCG/ACT spray    FLONASE    1 Bottle    Spray 1-2 sprays into both nostrils daily    Acute allergic rhinitis due to pollen, unspecified seasonality

## 2018-07-17 NOTE — TELEPHONE ENCOUNTER
This writer attempted to contact Charity on 07/17/18 via       Reason for call appointment needed and left message.      If patient calls back:   Patient contacted by a Registered Nurse. Inform patient that someone from the RN group will contact them, document that pt called and route to P DYAD 3 RN POOL [[id number]]    Araceli Dorado RN, BSN

## 2018-07-17 NOTE — LETTER
7/17/2018         RE: Charity Guo  [address] [place] [address]        Dear Colleague,    Thank you for referring your patient, Charity Guo, to the [facility]. Please see a copy of my visit note below.    Visit Date:   07/17/2018      PATIENT REFERRED BY:  CHELSY Pinto, JASON.        HISTORY OF PRESENT ILLNESS:  This patient is a 44-year-old right-handed woman seen at the request of primary care for a neurologic consultation with chief complaint of double vision. She was in clinic today with the , Oneil. She reports that she has vertical double vision.  It is present in each eye individually but worse on the left than on the right.  It began a month or so ago.  It has not really evolved since that time.  It does not fluctuate.  It is present all day every day.  When she reads, she notices blurring of the words.  She does not have double vision when she reads.  When she looks out while driving then she will see vertical diplopia including of the road and of other objects.  She does not have any eye pain.  She has no issues with her hearing, speech or swallowing.  She does not have focal symptoms in her arms or legs such as numbness, tingling, pins or needles.  She has no problem with weakness or with her walking.  Her balance is good.  She has no issues with bowel or bladder control.      PAST MEDICAL HISTORY:  Negative for diabetes, thyroid, asthma or high blood pressure.  She is not on chronic medications.  She thinks she is pregnant.  She does not have pertinent surgical or trauma history.  She does not drink or smoke.      SOCIAL HISTORY:  She is  with 3 children and works as a .      FAMILY HISTORY:  Noncontributory.      PHYSICAL EXAMINATION:   GENERAL:  The patient is cooperative and in no distress.   VITAL SIGNS:  Her blood pressure is 121/84.   NECK:  There are no carotid bruits but auscultation of the heart shows S1, S2, without  murmur, rub or gallop.   CHEST:  Clear to auscultation.   NEUROLOGIC:  The patient is alert, oriented and lucid.  Cranial nerve testing shows full visual fields to confrontation.  Funduscopic exam shows sharp discs bilaterally.  Visual acuity is 20/20 bilaterally.  Eye movements are complete and conjugate without nystagmus.  This includes vertical eye movements.  Convergence is preserved.  Pupils react to light.  Facies are symmetric.  Facial sensation is symmetric.  Tongue protrudes in the midline.  Motor evaluation shows no pronator drift, normal finger tapping, finger-nose-finger and heel-knee-shin.  She has good strength in the arms and legs.  Muscle stretch reflexes are low amplitude and symmetric.  Toes are downgoing.  Sensory exam shows preserved vibration and temperature.  Romberg sign is absent.  She can walk on her heels, toes and tandem.      ASSESSMENT:  Monocular diplopia, right greater than left.      DISCUSSION:  This patient is seen for evaluation of double vision.  By history, it appears to be monocular diplopia in each eye, with the symptoms more prominent in the left eye alone than in the right eye.  This would suggest more of an ocular type of problem.  She did see optometry but no recommendations were made by her report.  I do not find any abnormalities on exam today.  I am going to obtain an MRI scan of the brain to rule out any structural or inflammatory lesions that could be a factor.  I am going to check a thyroid panel.  I am going to refer her to neurophthalmology to see if they have any other recommendations for evaluation and diagnosis.  I will communicate the results of the MRI to her when available.  It would have to be through an .           SCOTTY IVY MD             D: 2018   T: 2018   MT: TOMA      Name:     ALEXANDER SAENZ   MRN:      [id number]        Account:      [id number]   :      1975           Visit Date:   2018      Document:  [id number]        Pt called, results communicated. See Telephone encounter. Yeni Ignacio RN  July 17, 2018  3:07 PM     Again, thank you for allowing me to participate in the care of your patient.        Sincerely,        Justyn Calle MD

## 2018-07-17 NOTE — NURSING NOTE
Charity Guo's goals for this visit include:   Chief Complaint   Patient presents with     Consult     double vision       She requests these members of her care team be copied on today's visit information: Yes    PCP: No Ref-Primary, Physician    Referring Provider:  Meagan Underwood APRN CNP  [address] [place] [address]    /84 (BP Location: Left arm, Patient Position: Sitting, Cuff Size: Adult Large)  Pulse 84  Wt 95.2 kg (209 lb 14.4 oz)  SpO2 98%  BMI 37.66 kg/m2    Do you need any medication refills at today's visit? No

## 2018-07-19 NOTE — TELEPHONE ENCOUNTER
Patient updated on the below, no questions at this time, verbalized understanding.    Angela Betancourt RN

## 2018-07-24 ENCOUNTER — TELEPHONE (OUTPATIENT)
Dept: NEUROLOGY | Facility: CLINIC | Age: 43
End: 2018-07-24

## 2018-07-24 NOTE — TELEPHONE ENCOUNTER
----- Message from Justyn Calle MD sent at 7/24/2018  2:36 PM CDT -----  Regarding: FW: MRI patient pregnant  Contact: [phone]  Please advise pt thru  that since MRI was not done, she should certainly see neuro-ophthalmology in consult, as arranged for 1 August.  Thanks. jf  ----- Message -----     From: Qiana Bowser RN     Sent: 7/24/2018   2:19 PM       To: Justyn Calle MD  Subject: MRI patient pregnant                             Dr. Calle,  Today we had ALEXANDER SAENZ [[id number]] in for a Brain MRI. She is 4 weeks pregnant. We are not allowed to do MRI's during the First Trimester of pregnancy. We informed the patient to contact your office on what to do next.  Thank you,  Qiana Bowser

## 2018-07-27 ENCOUNTER — OFFICE VISIT (OUTPATIENT)
Dept: OPTOMETRY | Facility: CLINIC | Age: 43
End: 2018-07-27
Payer: COMMERCIAL

## 2018-07-27 DIAGNOSIS — H53.2 MONOCULAR DIPLOPIA OF LEFT EYE: Primary | ICD-10-CM

## 2018-07-27 PROCEDURE — 99212 OFFICE O/P EST SF 10 MIN: CPT | Performed by: OPTOMETRIST

## 2018-07-27 ASSESSMENT — EXTERNAL EXAM - LEFT EYE: OS_EXAM: NORMAL

## 2018-07-27 ASSESSMENT — EXTERNAL EXAM - RIGHT EYE: OD_EXAM: NORMAL

## 2018-07-27 ASSESSMENT — VISUAL ACUITY
OD_SC: 20/20
OD_SC+: -1
METHOD: SNELLEN - LINEAR

## 2018-07-27 ASSESSMENT — REFRACTION_MANIFEST
OD_SPHERE: -0.25
OS_AXIS: 038
OS_SPHERE: -0.50
OD_AXIS: 083
OD_CYLINDER: +0.25
OS_CYLINDER: +0.25

## 2018-07-27 ASSESSMENT — SLIT LAMP EXAM - LIDS
COMMENTS: MEIBOMIAN GLAND DYSFUNCTION
COMMENTS: MEIBOMIAN GLAND DYSFUNCTION

## 2018-07-27 NOTE — MR AVS SNAPSHOT
After Visit Summary   7/27/2018    Charity Guo    MRN: [id number]           Patient Information     Date Of Birth          1975        Visit Information        Provider Department      7/27/2018 2:15 PM Arturo Barros, OD; [employer] [facility]        Today's Diagnoses     Monocular diplopia of left eye    -  1      Care Instructions    Continue artificial tears as directed.    Keep appointment with neuro-ophthalmology for evaluation.    Arturo Barros, CHAD            Follow-ups after your visit        Your next 10 appointments already scheduled     Aug 01, 2018  2:00 PM CDT   New Adult Strabismus with Jared Moreland MD   Eye Clinic ([facility])    [facility] [address]  [address]  [facility] [address] [phone]            Aug 03, 2018  2:30 PM CDT   New Prenatal with Kylie Stafford MD   [facility]    [address] [place] [address] [phone]              Who to contact     If you have questions or need follow up information about today's clinic visit or your schedule please contact [facility] directly at [phone].  Normal or non-critical lab and imaging results will be communicated to you by MyChart, letter or phone within 4 business days after the clinic has received the results. If you do not hear from us within 7 days, please contact the clinic through MyChart or phone. If you have a critical or abnormal lab result, we will notify you by phone as soon as possible.  Submit refill requests through [employer] or call your pharmacy and they will forward the refill request to us. Please allow 3 business days for your refill to be completed.          Additional Information About Your Visit        Care EveryWhere ID     This is your Care EveryWhere ID. This could be used by other organizations to access your [facility]  medical records  [id number]         Blood Pressure from Last 3 Encounters:   07/17/18 121/84   06/15/18 131/80   05/30/18 (!) 164/118    Weight from Last 3 Encounters:   07/17/18 95.2 kg (209 lb 14.4 oz)   06/15/18 95.3 kg (210 lb)   05/30/18 94.3 kg (207 lb 12.8 oz)              Today, you had the following     No orders found for display       Primary Care Provider Fax #    Physician No Ref-Primary [phone]       No address on file        Equal Access to Services     [facility]: Hadii travis robles Soflorentin, waaxda luqadaha, qaybta kaalmada iveth, lindsay pink . So [facility] [phone].    ATENCIÓN: Si habla español, tiene a lopez disposición servicios gratuitos de asistencia lingüística. Llame al [phone].    We comply with applicable federal civil rights laws and [place] laws. We do not discriminate on the basis of race, color, national origin, age, disability, sex, sexual orientation, or gender identity.            Thank you!     Thank you for choosing [facility]  for your care. Our goal is always to provide you with excellent care. Hearing back from our patients is one way we can continue to improve our services. Please take a few minutes to complete the written survey that you may receive in the mail after your visit with us. Thank you!             Your Updated Medication List - Protect others around you: Learn how to safely use, store and throw away your medicines at www.disposemymeds.org.          This list is accurate as of 7/27/18  2:32 PM.  Always use your most recent med list.                   Brand Name Dispense Instructions for use Diagnosis    fluticasone 50 MCG/ACT spray    FLONASE    1 Bottle    Spray 1-2 sprays into both nostrils daily    Acute allergic rhinitis due to pollen, unspecified seasonality

## 2018-07-27 NOTE — PATIENT INSTRUCTIONS
Continue artificial tears as directed.    Keep appointment with neuro-ophthalmology for evaluation.    Arturo Barros, OD

## 2018-07-27 NOTE — PROGRESS NOTES
Red Eye Recheck    Reason:   Chief Complaint   Patient presents with     Eye Problem     andre os eye         Eyes feel: a little bit better better    Medications used: systane     How often: 4 times per day,some days did not put any drops in  Saw Dr. Calle in neurology- he referred to neuro opthalmology.  She could not get an MRI because she is pregnant      Valeria Garcia, Optometric Assistant, A.B.O.C.     OBJECTIVE:     See ophthalmology exam      ASSESSMENT:        ICD-10-CM    1. Monocular diplopia of left eye H53.2           PLAN:       Patient Instructions   Continue artificial tears as directed.    Keep appointment with neuro-ophthalmology for evaluation.    Arturo Barros, OD

## 2018-08-03 ENCOUNTER — PRENATAL OFFICE VISIT (OUTPATIENT)
Dept: OBGYN | Facility: CLINIC | Age: 43
End: 2018-08-03
Payer: COMMERCIAL

## 2018-08-03 VITALS
HEART RATE: 82 BPM | DIASTOLIC BLOOD PRESSURE: 94 MMHG | SYSTOLIC BLOOD PRESSURE: 133 MMHG | WEIGHT: 207.6 LBS | BODY MASS INDEX: 38.2 KG/M2 | HEIGHT: 62 IN

## 2018-08-03 DIAGNOSIS — O09.521 ELDERLY MULTIGRAVIDA IN FIRST TRIMESTER: Primary | ICD-10-CM

## 2018-08-03 DIAGNOSIS — Z36.87 UNSURE OF LMP (LAST MENSTRUAL PERIOD) AS REASON FOR ULTRASOUND SCAN: ICD-10-CM

## 2018-08-03 DIAGNOSIS — Z11.3 ROUTINE SCREENING FOR STI (SEXUALLY TRANSMITTED INFECTION): ICD-10-CM

## 2018-08-03 DIAGNOSIS — R03.0 BLOOD PRESSURE ELEVATED WITHOUT HISTORY OF HTN: ICD-10-CM

## 2018-08-03 DIAGNOSIS — Z12.4 SCREENING FOR MALIGNANT NEOPLASM OF CERVIX: ICD-10-CM

## 2018-08-03 PROBLEM — Z32.01 PREGNANCY TEST POSITIVE: Status: RESOLVED | Noted: 2018-06-18 | Resolved: 2018-08-03

## 2018-08-03 PROBLEM — O09.529 AMA (ADVANCED MATERNAL AGE) MULTIGRAVIDA 35+: Status: ACTIVE | Noted: 2018-08-03

## 2018-08-03 LAB
ALBUMIN SERPL-MCNC: 3.7 G/DL (ref 3.4–5)
ALBUMIN UR-MCNC: NEGATIVE MG/DL
ALP SERPL-CCNC: 53 U/L (ref 40–150)
ALT SERPL W P-5'-P-CCNC: 27 U/L (ref 0–50)
ANION GAP SERPL CALCULATED.3IONS-SCNC: 9 MMOL/L (ref 3–14)
APPEARANCE UR: CLEAR
AST SERPL W P-5'-P-CCNC: 16 U/L (ref 0–45)
BILIRUB SERPL-MCNC: 0.2 MG/DL (ref 0.2–1.3)
BILIRUB UR QL STRIP: NEGATIVE
BUN SERPL-MCNC: 8 MG/DL (ref 7–30)
CALCIUM SERPL-MCNC: 8.8 MG/DL (ref 8.5–10.1)
CHLORIDE SERPL-SCNC: 106 MMOL/L (ref 94–109)
CO2 SERPL-SCNC: 23 MMOL/L (ref 20–32)
COLOR UR AUTO: NORMAL
CREAT SERPL-MCNC: 0.45 MG/DL (ref 0.52–1.04)
ERYTHROCYTE [DISTWIDTH] IN BLOOD BY AUTOMATED COUNT: 14.8 % (ref 10–15)
GFR SERPL CREATININE-BSD FRML MDRD: >90 ML/MIN/1.7M2
GLUCOSE SERPL-MCNC: 85 MG/DL (ref 70–99)
GLUCOSE UR STRIP-MCNC: NEGATIVE MG/DL
HCT VFR BLD AUTO: 35.4 % (ref 35–47)
HGB BLD-MCNC: 11.3 G/DL (ref 11.7–15.7)
HGB UR QL STRIP: NEGATIVE
KETONES UR STRIP-MCNC: NEGATIVE MG/DL
LEUKOCYTE ESTERASE UR QL STRIP: NEGATIVE
MCH RBC QN AUTO: 26.4 PG (ref 26.5–33)
MCHC RBC AUTO-ENTMCNC: 31.9 G/DL (ref 31.5–36.5)
MCV RBC AUTO: 83 FL (ref 78–100)
NITRATE UR QL: NEGATIVE
PH UR STRIP: 6.5 PH (ref 5–7)
PLATELET # BLD AUTO: 294 10E9/L (ref 150–450)
POTASSIUM SERPL-SCNC: 3.8 MMOL/L (ref 3.4–5.3)
PROT SERPL-MCNC: 8.1 G/DL (ref 6.8–8.8)
RBC # BLD AUTO: 4.28 10E12/L (ref 3.8–5.2)
SODIUM SERPL-SCNC: 138 MMOL/L (ref 133–144)
SOURCE: NORMAL
SP GR UR STRIP: 1.01 (ref 1–1.03)
UROBILINOGEN UR STRIP-MCNC: NORMAL MG/DL (ref 0–2)
WBC # BLD AUTO: 7.2 10E9/L (ref 4–11)

## 2018-08-03 PROCEDURE — 86762 RUBELLA ANTIBODY: CPT | Performed by: OBSTETRICS & GYNECOLOGY

## 2018-08-03 PROCEDURE — 81003 URINALYSIS AUTO W/O SCOPE: CPT | Performed by: OBSTETRICS & GYNECOLOGY

## 2018-08-03 PROCEDURE — 87591 N.GONORRHOEAE DNA AMP PROB: CPT | Performed by: OBSTETRICS & GYNECOLOGY

## 2018-08-03 PROCEDURE — 80053 COMPREHEN METABOLIC PANEL: CPT | Performed by: OBSTETRICS & GYNECOLOGY

## 2018-08-03 PROCEDURE — G0145 SCR C/V CYTO,THINLAYER,RESCR: HCPCS | Performed by: OBSTETRICS & GYNECOLOGY

## 2018-08-03 PROCEDURE — 87340 HEPATITIS B SURFACE AG IA: CPT | Performed by: OBSTETRICS & GYNECOLOGY

## 2018-08-03 PROCEDURE — 99207 ZZC COMPLICATED OB VISIT: CPT | Performed by: OBSTETRICS & GYNECOLOGY

## 2018-08-03 PROCEDURE — 87491 CHLMYD TRACH DNA AMP PROBE: CPT | Performed by: OBSTETRICS & GYNECOLOGY

## 2018-08-03 PROCEDURE — 86850 RBC ANTIBODY SCREEN: CPT | Performed by: OBSTETRICS & GYNECOLOGY

## 2018-08-03 PROCEDURE — 85027 COMPLETE CBC AUTOMATED: CPT | Performed by: OBSTETRICS & GYNECOLOGY

## 2018-08-03 PROCEDURE — 87389 HIV-1 AG W/HIV-1&-2 AB AG IA: CPT | Performed by: OBSTETRICS & GYNECOLOGY

## 2018-08-03 PROCEDURE — 86780 TREPONEMA PALLIDUM: CPT | Performed by: OBSTETRICS & GYNECOLOGY

## 2018-08-03 PROCEDURE — 86900 BLOOD TYPING SEROLOGIC ABO: CPT | Performed by: OBSTETRICS & GYNECOLOGY

## 2018-08-03 PROCEDURE — 86901 BLOOD TYPING SEROLOGIC RH(D): CPT | Performed by: OBSTETRICS & GYNECOLOGY

## 2018-08-03 PROCEDURE — 36415 COLL VENOUS BLD VENIPUNCTURE: CPT | Performed by: OBSTETRICS & GYNECOLOGY

## 2018-08-03 PROCEDURE — 87086 URINE CULTURE/COLONY COUNT: CPT | Performed by: OBSTETRICS & GYNECOLOGY

## 2018-08-03 PROCEDURE — 87624 HPV HI-RISK TYP POOLED RSLT: CPT | Performed by: OBSTETRICS & GYNECOLOGY

## 2018-08-03 NOTE — NURSING NOTE
"Chief Complaint   Patient presents with     Prenatal Care       Initial BP (!) 133/94 (BP Location: Right arm, Patient Position: Chair, Cuff Size: Adult Regular)  Pulse 82  Ht 5' 2.4\" (1.585 m)  Wt 207 lb 9.6 oz (94.2 kg)  LMP 2018 (Exact Date)  BMI 37.48 kg/m2 Estimated body mass index is 37.48 kg/(m^2) as calculated from the following:    Height as of this encounter: 5' 2.4\" (1.585 m).    Weight as of this encounter: 207 lb 9.6 oz (94.2 kg).  BP completed using cuff size: regular        The following HM Due: pap smear      The following patient reported/Care Every where data was sent to:  P ABSTRACT QUALITY INITIATIVES [[id number]]       patient has appointment for today    Trish Lopez CMA  August 3, 2018           "

## 2018-08-03 NOTE — PROGRESS NOTES
43 year old  at 10w2d presents for New OB appointment.  Estimated Date of Delivery: 2019 by LMP.  She is not sure if her LMP was Feb, Apr, or May.  History of irregular periods.      History of spontaneous vaginal delivery x4.  No complications per patient.  [place], [place], [place].  No records from [place] available in Care Everywhere.    Mild diabetes with her last pregnancy.  No history of preeclampsia    Recent HgbA1C was normal.        No complaints.   No vaginal bleeding, no leaking fluid, no contractions.      Electronic chart, including labs and imaging reviewed.    Last PHQ-9 score on record= No flowsheet data found.    Obstetric History  Obstetric History       T0      L0     SAB0   TAB0   Ectopic0   Multiple0   Live Births0       # Outcome Date GA Lbr Jamaal/2nd Weight Sex Delivery Anes PTL Lv   1 Current                 98 female 6 lbs 1 oz  2004 female  7 lbs 11 oz  05 female 6 lbs 14 oz  Miscarriage       Gynecologic History  History of pelvic infections: None  Last Pap:   History of abnormal Pap: None  Prior LEEP: None      Most Recent Immunizations   Administered Date(s) Administered     HepB-Adult 07/15/2016     TDAP Vaccine (Adacel) 2014        Patient Active Problem List   Diagnosis     Elevated blood pressure reading without diagnosis of hypertension     History of spontaneous      Pregnancy test positive     Monocular diplopia of left eye     Subclinical hyperthyroidism       Current Outpatient Prescriptions   Medication     fluticasone (FLONASE) 50 MCG/ACT spray     No current facility-administered medications for this visit.        No Known Allergies    History  History reviewed. No pertinent past medical history.  History reviewed. No pertinent surgical history.    Social History     Social History     Marital status:      Spouse name: N/A     Number of children: N/A     Years of education: N/A     Occupational  "History     Not on file.     Social History Main Topics     Smoking status: Never Smoker     Smokeless tobacco: Never Used     Alcohol use No     Drug use: No     Sexual activity: Yes     Partners: Male     Other Topics Concern     Not on file     Social History Narrative       ROS - Please see HPI, otherwise 10pt ROS negative.      Physical Exam  Vitals: BP (!) 133/94 (BP Location: Right arm, Patient Position: Chair, Cuff Size: Adult Regular)  Pulse 82  Ht 5' 2.4\" (1.585 m)  Wt 207 lb 9.6 oz (94.2 kg)  LMP 2018 (Exact Date)  BMI 37.48 kg/m2  BMI= Body mass index is 37.48 kg/(m^2).  Gen: Alert and oriented times 3, no acute distress.  Well developed, well nourished, pleasant.    Neck: Supple, no masses.  No thyromegaly.  Chest:  Non labored.  Clear to auscultation bilaterally.    Heart: Regular, normal S1, S2.  No murmurs.   Abdomen: Soft, nontender, nondistended.  No palpable masses.    :  Normal female external genitalia, Darron stage V.  No lesions.  Speculum exam reveals a normal vaginal vault, normal cervix.  No abnormal discharge.  Bimanual exam reveals a normal, mobile, nontender uterus, size consistent with dates.  No cervical motion tenderness.  Adnexa nontender with no palpable masses.   Extremities:  Nontender, no edema.    Pap obtained Yes      Assessment and Plan:  43 year old  with IUP at 10w2d.      ICD-10-CM    1. Elderly multigravida in first trimester O09.521 ABO/Rh type and screen     Hepatitis B surface antigen     HIV Antigen Antibody Combo     Rubella Antibody IgG Quantitative     Treponema Abs w Reflex to RPR and Titer     Urine Culture Aerobic Bacterial     UA reflex to Microscopic     CBC with platelets   2. Screening for malignant neoplasm of cervix Z12.4 Pap imaged thin layer screen with HPV - recommended age 30 - 65 years (select HPV order below)     HPV High Risk Types DNA Cervical   3. Routine screening for STI (sexually transmitted infection) Z11.3 Chlamydia " trachomatis PCR     Neisseria gonorrhoeae PCR   4. Blood pressure elevated without history of HTN R03.0 Comprehensive metabolic panel   5. Unsure of LMP (last menstrual period) as reason for ultrasound scan Z36.87 US OB < 14 Weeks w Transvaginal       Discussed:    Genetic screening options:  Plan to send to [facility] after we have her due date confirmed    Labs and ultrasound ordered.  Early GCT not needed (recently normal)    Exercise, weight gain, schedule of visits, routine and indicated ultrasounds, prenatal vitamins and childbirth education.      Body mass index is 37.48 kg/(m^2). - recommend 11-20 lbs (BMI >30)    Return in 4 weeks for routine OB care      30 minutes was spent face to face with the patient today discussing her history, diagnosis, and follow-up plan as noted above.  Over 50% of the visit was spent in counseling and coordination of care.    Patient seen with professional     Kylie Stafford MD FACOG

## 2018-08-03 NOTE — LETTER
August 15, 2018    Charity Gallardoarez  [address] [place] [address]    Dear Charity,  We are happy to inform you that your PAP smear result from 8/3/18 is normal.  We are now able to do a follow up test on PAP smears. The DNA test is for HPV (Human Papilloma Virus). Cervical cancer is closely linked with certain types of HPV. Your results showed no evidence of high risk HPV.  Therefore we recommend you return in 5 years for your next pap smear and HPV test.  You will still need to return to the clinic every year for an annual exam and other preventive tests.  Please contact the clinic at [phone] with any questions.  Sincerely,    Kylie Stafford MD/elysia

## 2018-08-04 LAB
BACTERIA SPEC CULT: NORMAL
RUBV IGG SERPL IA-ACNC: >250 IU/ML
SPECIMEN SOURCE: NORMAL
T PALLIDUM AB SER QL: NONREACTIVE

## 2018-08-04 ASSESSMENT — PATIENT HEALTH QUESTIONNAIRE - PHQ9: SUM OF ALL RESPONSES TO PHQ QUESTIONS 1-9: 7

## 2018-08-05 LAB
C TRACH DNA SPEC QL NAA+PROBE: NEGATIVE
N GONORRHOEA DNA SPEC QL NAA+PROBE: NEGATIVE
SPECIMEN SOURCE: NORMAL
SPECIMEN SOURCE: NORMAL

## 2018-08-06 LAB
ABO + RH BLD: NORMAL
ABO + RH BLD: NORMAL
BLD GP AB SCN SERPL QL: NORMAL
BLOOD BANK CMNT PATIENT-IMP: NORMAL
HBV SURFACE AG SERPL QL IA: NONREACTIVE
HIV 1+2 AB+HIV1 P24 AG SERPL QL IA: NONREACTIVE
SPECIMEN EXP DATE BLD: NORMAL

## 2018-08-08 LAB
COPATH REPORT: NORMAL
PAP: NORMAL

## 2018-08-09 LAB
FINAL DIAGNOSIS: NORMAL
HPV HR 12 DNA CVX QL NAA+PROBE: NEGATIVE
HPV16 DNA SPEC QL NAA+PROBE: NEGATIVE
HPV18 DNA SPEC QL NAA+PROBE: NEGATIVE
SPECIMEN DESCRIPTION: NORMAL
SPECIMEN SOURCE CVX/VAG CYTO: NORMAL

## 2018-08-15 DIAGNOSIS — H53.10 SUBJECTIVE VISUAL DISTURBANCE: Primary | ICD-10-CM

## 2018-08-17 ENCOUNTER — OFFICE VISIT (OUTPATIENT)
Dept: OPHTHALMOLOGY | Facility: CLINIC | Age: 43
End: 2018-08-17
Attending: OPHTHALMOLOGY
Payer: COMMERCIAL

## 2018-08-17 DIAGNOSIS — H50.22 HYPERTROPIA OF LEFT EYE: Primary | ICD-10-CM

## 2018-08-17 DIAGNOSIS — H53.10 SUBJECTIVE VISUAL DISTURBANCE: ICD-10-CM

## 2018-08-17 PROCEDURE — 92015 DETERMINE REFRACTIVE STATE: CPT | Mod: ZF | Performed by: TECHNICIAN/TECHNOLOGIST

## 2018-08-17 PROCEDURE — G0463 HOSPITAL OUTPT CLINIC VISIT: HCPCS | Mod: 25,ZF | Performed by: TECHNICIAN/TECHNOLOGIST

## 2018-08-17 PROCEDURE — 92060 SENSORIMOTOR EXAMINATION: CPT | Mod: ZF | Performed by: OPHTHALMOLOGY

## 2018-08-17 ASSESSMENT — EXTERNAL EXAM - LEFT EYE: OS_EXAM: NORMAL

## 2018-08-17 ASSESSMENT — EXTERNAL EXAM - RIGHT EYE: OD_EXAM: NORMAL

## 2018-08-17 ASSESSMENT — VISUAL ACUITY
OS_SC: 20/20
METHOD: SNELLEN - LINEAR
OD_SC: 20/25

## 2018-08-17 ASSESSMENT — CUP TO DISC RATIO
OD_RATIO: 0.3
OS_RATIO: 0.3

## 2018-08-17 ASSESSMENT — REFRACTION_MANIFEST
OS_CYLINDER: SPHERE
OD_SPHERE: -0.50
OS_SPHERE: -0.25
OD_CYLINDER: SPHERE

## 2018-08-17 ASSESSMENT — TONOMETRY
IOP_METHOD: ICARE
OD_IOP_MMHG: 10
OS_IOP_MMHG: 10

## 2018-08-17 ASSESSMENT — CONF VISUAL FIELD
METHOD: COUNTING FINGERS
OS_NORMAL: 1
OD_NORMAL: 1

## 2018-08-17 NOTE — NURSING NOTE
"Chief Complaints and History of Present Illnesses   Patient presents with     New Patient     referral for neuro-ophthalmology evaluation for subjective visual disturbance     HPI    Symptoms:              Comments:  Per referral notes from Dr. Calle:   \"...vertical double vision.  It is present in each eye individually but worse on the left than on the right.  It began a month or so ago.  It has not really evolved since that time.  It does not fluctuate.  It is present all day every day.  When she reads, she notices blurring of the words.  She does not have double vision when she reads.  When she looks out while driving then she will see vertical diplopia including of the road and of other objects. \"    Unable to do MRI because 12 weeks pregnant.   Patient states vertical diplopia about 2-3 months ago. Patient states if she closes either eye, double improves but she still sees double.   Double vision is improving per patient.     ELENITA Johnston 8/17/2018 1:28 PM               "

## 2018-08-17 NOTE — PROGRESS NOTES
1. Intermittent binocular diplopia from a left hypertropia of unclear etiology: her pattern does not fit a cranial nerve palsy. The intermittent nature of her double vision concerning for myasthenia gravis though recent abnormal thyroid studies and lateral flare of left upper eyelid may be subtle finding of thyroid eye disease. Patient could also have an atypical presentation of skew deviation or decompensated congenital strabismus. We will obtain MRI brain and orbits without contrast (given pregnancy), TSI and Ach binding antibody.     Note: Despite the patient's misleading history, she has binocular diplopia not monocular diplopia on exam today. I suspect she was confused about that line of questioning in the past.    Charity PALMER is a pleasant 43 year old  female (currently 3 months pregnant) who presents to my neuro-ophthalmology clinic today for double vision.     For the past 2 months she noticed double vision from her left eye only. This is present even when she closes the right, but appears better. This is vertical. Sometimes overlapping shadows. This comes and goes. This can last up to 15 minutes.   She denies other ocular or medical history. No history of strabismus in her or family.  She does feel like the left eye is dry - gritty/esperanza sensation about 2x/week. She uses artifical tears four times a day. Her double has improved slightly but not resolved.  No ptosis. No drooling. No trouble swallowing. No muscular weakness. No fever. Headaches perhaps twice monthly and have not changed recently.     Last TSH 7/17/18: 0.19 (normal 0.40-4) but previous value TSH 6/15/18 within normal limits.       On examination, her vision is 20/25 right eye and 20/20 left eye. She does have have any afferent pupillary defect. Color plates full both eyes. She does have a left face turn. She does have left hypertropia worse in left gaze and a small esotropia. She has trace abduction deficit. Cranial  nerves 3-12 were grossly intact bilaterally including full motility in both eyes, normal V1-V3 sensation to light touch, normal facial tone including normal orbicularis oculi strength, normal hearing to finger rub, midline uvula and palate, normal tongue protrusion, and normal sternocleidomastoid strength. She does have ptosis. Full orbicularis strength with no upgaze lid fatigue. Her brandie measurements were symmetric and normal. Her anterior segment examination was unremarkable - no eyelid edema, erythema, conjunctival edema or erythema. Her funduscopic examination is unremarkable.     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 Her strabismus pattern does not fit a cranial nerve 3 or 4 palsy. She does have any other neurological symptoms to suggest skew deviation. Her eyes appear white and quiet and brandie measurements were symmetric and normal bilaterally. She does not have any ptosis or lid fatigue but her story of intermittent vertical double vision is concerning for myasthenia gravis.  She had no fatigue on prolonged up-gaze testing nor orbicularis oculi weakness. We will obtain Ach binding antibody, TSI, and MRI brain and orbits without contrast. We did discuss that it is safe for her to get a MRI without contrast despite her pregnancy.    Follow-up in 4 weeks or sooner as needed for a significant change in her vision or diplopia.        Complete documentation of historical and exam elements from today's encounter can be found in the full encounter summary report (not reduplicated in this progress note).  I personally obtained the chief complaint(s) and history of present illness.  I confirmed and edited as necessary the review of systems, past medical/surgical history, family history, social history, and examination findings as documented by others; and I examined the patient myself.  I personally reviewed the relevant tests, images, and reports as documented above.  I formulated and edited  as necessary the assessment and plan and discussed the findings and management plan with the patient and family.  I personally reviewed the ophthalmic test(s) associated with this encounter, agree with the interpretation(s) as documented by the resident/fellow, and have edited the corresponding report(s) as necessary.     MD Jacinto Mccollum MD  Neuro-ophthalmology Fellow

## 2018-08-17 NOTE — MR AVS SNAPSHOT
After Visit Summary   8/17/2018    Charity PALMER    MRN: [id number]           Patient Information     Date Of Birth          1975        Visit Information        Provider Department      8/17/2018 1:00 PM Jared Moreland MD; [facility]        Today's Diagnoses     Hypertropia of left eye    -  1    Subjective visual disturbance           Follow-ups after your visit        Your next 10 appointments already scheduled     Aug 24, 2018  2:00 PM CDT   MR BRAIN W/O CONTRAST with UUMR2   [facility], [place], [facility] ([facility], [facility])    [address] [address] [phone]           Take your medicines as usual, unless your doctor tells you not to. Bring a list of your current medicines to your exam (including vitamins, minerals and over-the-counter drugs). Also bring the results of similar scans you may have had.  Please remove any body piercings and hair extensions before you arrive.  Follow your doctor s orders. If you do not, we may have to postpone your exam.  You may or may not receive IV contrast for this exam pending the discretion of the Radiologist.  You do not need to do anything special to prepare.  The MRI machine uses a strong magnet. Please wear clothes without metal (snaps, zippers). A sweatsuit works well, or we may give you a hospital gown.   **IMPORTANT** THE INSTRUCTIONS BELOW ARE ONLY FOR THOSE PATIENTS WHO HAVE BEEN PRESCRIBED SEDATION OR GENERAL ANESTHESIA DURING THEIR MRI PROCEDURE:  IF YOUR DOCTOR PRESCRIBED ORAL SEDATION (take medicine to help you relax during your exam):   You must get the medicine from your doctor (oral medication) before you arrive. Bring the medicine to the exam. Do not take it at home. You ll be told when to take it upon arriving for your exam.   Arrive one hour early. Bring someone who can take you home after the test. Your medicine will make you  sleepy. After the exam, you may not drive, take a bus or take a taxi by yourself.  IF YOUR DOCTOR PRESCRIBED IV SEDATION:   Arrive one hour early. Bring someone who can take you home after the test. Your medicine will make you sleepy. After the exam, you may not drive, take a bus or take a taxi by yourself.   No eating 6 hours before your exam. You may have clear liquids up until 4 hours before your exam. (Clear liquids include water, clear tea, black coffee and fruit juice without pulp.)  IF YOUR DOCTOR PRESCRIBED ANESTHESIA (be asleep for your exam):   Arrive 1 1/2 hours early. Bring someone who can take you home after the test. You may not drive, take a bus or take a taxi by yourself.   No eating 8 hours before your exam. You may have clear liquids up until 4 hours before your exam. (Clear liquids include water, clear tea, black coffee and fruit juice without pulp.)   You will spend four to five hours in the recovery room.  Please call the Imaging Department at your exam site with any questions.            Aug 24, 2018  2:30 PM CDT   MR ORBIT W/O CONTRAST with UUMR2   [facility], [place], MRI ([facility], [facility])    [address] [facility] [address] [phone]           Take your medicines as usual, unless your doctor tells you not to. Bring a list of your current medicines to your exam (including vitamins, minerals and over-the-counter drugs). Also bring the results of similar scans you may have had.  Please remove any body piercings and hair extensions before you arrive.  Follow your doctor s orders. If you do not, we may have to postpone your exam.  You may or may not receive IV contrast for this exam pending the discretion of the Radiologist.  You do not need to do anything special to prepare.  The MRI machine uses a strong magnet. Please wear clothes without metal (snaps, zippers). A sweatsuit works well, or we may give you a hospital gown.    **IMPORTANT** THE INSTRUCTIONS BELOW ARE ONLY FOR THOSE PATIENTS WHO HAVE BEEN PRESCRIBED SEDATION OR GENERAL ANESTHESIA DURING THEIR MRI PROCEDURE:  IF YOUR DOCTOR PRESCRIBED ORAL SEDATION (take medicine to help you relax during your exam):   You must get the medicine from your doctor (oral medication) before you arrive. Bring the medicine to the exam. Do not take it at home. You ll be told when to take it upon arriving for your exam.   Arrive one hour early. Bring someone who can take you home after the test. Your medicine will make you sleepy. After the exam, you may not drive, take a bus or take a taxi by yourself.  IF YOUR DOCTOR PRESCRIBED IV SEDATION:   Arrive one hour early. Bring someone who can take you home after the test. Your medicine will make you sleepy. After the exam, you may not drive, take a bus or take a taxi by yourself.   No eating 6 hours before your exam. You may have clear liquids up until 4 hours before your exam. (Clear liquids include water, clear tea, black coffee and fruit juice without pulp.)  IF YOUR DOCTOR PRESCRIBED ANESTHESIA (be asleep for your exam):   Arrive 1 1/2 hours early. Bring someone who can take you home after the test. You may not drive, take a bus or take a taxi by yourself.   No eating 8 hours before your exam. You may have clear liquids up until 4 hours before your exam. (Clear liquids include water, clear tea, black coffee and fruit juice without pulp.)   You will spend four to five hours in the recovery room.  Please call the Imaging Department at your exam site with any questions.            Aug 31, 2018  9:45 AM CDT   US OB < 14 WEEKS WITH TRANSVAGINAL SINGLE with MGUS1, MG  mobicanvas   [facility] ([facility])    [address] [address] [place] [address] [phone]           Please bring a list of your medicines (including vitamins, minerals and over-the-counter drugs). Also, tell your doctor about any allergies you may  have. Wear comfortable clothes and leave your valuables at home.  Drink four 8-ounce glasses of fluid an hour before your exam. If you need to empty your bladder before your exam, try to release only a little urine. Then, drink another glass of fluid.  You may have up to two family members in the exam room. If you bring a small child, an adult must be there to care for him or her. No video or camera photography during the procedure.  Please call the Imaging Department at your exam site with any questions.            Aug 31, 2018 10:45 AM CDT   ESTABLISHED PRENATAL with Kylie Stafford MD   [facility] ([facility])    [address] [place] [address]   [phone]            Sep 19, 2018 12:30 PM CDT   RETURN NEURO with Jared Moreland MD   Eye Clinic ([facility])    [address]  [facility] [id number] [phone]              Future tests that were ordered for you today     Open Future Orders        Priority Expected Expires Ordered    Thyroid stimulating immunoglobulin Routine  11/15/2018 8/17/2018    MRI Brain w/o contrast Routine  8/17/2019 8/17/2018    MR Orbit w/o Contrast Routine  8/17/2019 8/17/2018    Acetylcholine receptor binding Routine  11/15/2018 8/17/2018            Who to contact     Please call your clinic at [phone] to:    Ask questions about your health    Make or cancel appointments    Discuss your medicines    Learn about your test results    Speak to your doctor            Additional Information About Your Visit        Care EveryWhere ID     This is your Care EveryWhere ID. This could be used by other organizations to access your [place] medical records  [id number]        Your Vitals Were     Last Period                   05/23/2018 (Exact Date)            Blood Pressure from Last 3 Encounters:   08/03/18 (!) 133/94   07/17/18 121/84   06/15/18 131/80    Weight  from Last 3 Encounters:   08/03/18 94.2 kg (207 lb 9.6 oz)   07/17/18 95.2 kg (209 lb 14.4 oz)   06/15/18 95.3 kg (210 lb)              We Performed the Following     IOP Measurement     Refraction        Primary Care Provider Fax #    Physician No Ref-Primary [phone]       No address on file        Equal Access to Services     [facility]: Hadii aad ku hadasho Somatthewali, waaxda luqadaha, qaybta kaalmada adeegyada, lindsay mccallum jaen raven naranjosparklemona pink . So [facility] [phone].    ATENCIÓN: Si habla español, tiene a lopez disposición servicios gratuitos de asistencia lingüística. Llame al [phone].    We comply with applicable federal civil rights laws and [place] laws. We do not discriminate on the basis of race, color, national origin, age, disability, sex, sexual orientation, or gender identity.            Thank you!     Thank you for choosing EYE CLINIC  for your care. Our goal is always to provide you with excellent care. Hearing back from our patients is one way we can continue to improve our services. Please take a few minutes to complete the written survey that you may receive in the mail after your visit with us. Thank you!             Your Updated Medication List - Protect others around you: Learn how to safely use, store and throw away your medicines at www.disposemymeds.org.          This list is accurate as of 8/17/18  4:08 PM.  Always use your most recent med list.                   Brand Name Dispense Instructions for use Diagnosis    fluticasone 50 MCG/ACT spray    FLONASE    1 Bottle    Spray 1-2 sprays into both nostrils daily    Acute allergic rhinitis due to pollen, unspecified seasonality

## 2018-08-17 NOTE — LETTER
2018    RE: Charity PALMER  : 1975  MRN: [id number]    Dear Dr. Calle,    Thank you for referring your patient, Charity PALMER, to my neuro-ophthalmology clinic recently.  After a thorough neuro-ophthalmic history and examination, I came to the following conclusions:     1. Intermittent binocular diplopia from a left hypertropia of unclear etiology: her pattern does not fit a cranial nerve palsy. The intermittent nature of her double vision concerning for myasthenia gravis though recent abnormal thyroid studies and lateral flare of left upper eyelid may be subtle finding of thyroid eye disease. Patient could also have an atypical presentation of skew deviation or decompensated congenital strabismus. We will obtain MRI brain and orbits without contrast (given pregnancy), TSI and Ach binding antibody.     Note: Despite the patient's misleading history, she has binocular diplopia not monocular diplopia on exam today. I suspect she was confused about that line of questioning in the past.    Charity PALMRE is a pleasant 43 year old  female (currently 3 months pregnant) who presents to my neuro-ophthalmology clinic today for double vision.     For the past 2 months she noticed double vision from her left eye only. This is present even when she closes the right, but appears better. This is vertical. Sometimes overlapping shadows. This comes and goes. This can last up to 15 minutes.   She denies other ocular or medical history. No history of strabismus in her or family.  She does feel like the left eye is dry - gritty/esperanza sensation about 2x/week. She uses artifical tears four times a day. Her double has improved slightly but not resolved.  No ptosis. No drooling. No trouble swallowing. No muscular weakness. No fever. Headaches perhaps twice monthly and have not changed recently.     Last TSH 18: 0.19 (normal 0.40-4) but previous value TSH 6/15/18 within normal  limits.       On examination, her vision is 20/25 right eye and 20/20 left eye. She does have have any afferent pupillary defect. Color plates full both eyes. She does have a left face turn. She does have left hypertropia worse in left gaze and a small esotropia. She has trace abduction deficit. Cranial nerves 3-12 were grossly intact bilaterally including full motility in both eyes, normal V1-V3 sensation to light touch, normal facial tone including normal orbicularis oculi strength, normal hearing to finger rub, midline uvula and palate, normal tongue protrusion, and normal sternocleidomastoid strength. She does have ptosis. Full orbicularis strength with no upgaze lid fatigue. Her brandie measurements were symmetric and normal. Her anterior segment examination was unremarkable - no eyelid edema, erythema, conjunctival edema or erythema. Her funduscopic examination is unremarkable.     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 Her strabismus pattern does not fit a cranial nerve 3 or 4 palsy. She does have any other neurological symptoms to suggest skew deviation. Her eyes appear white and quiet and brandie measurements were symmetric and normal bilaterally. She does not have any ptosis or lid fatigue but her story of intermittent vertical double vision is concerning for myasthenia gravis.  She had no fatigue on prolonged up-gaze testing nor orbicularis oculi weakness. We will obtain Ach binding antibody, TSI, and MRI brain and orbits without contrast. We did discuss that it is safe for her to get a MRI without contrast despite her pregnancy.    Follow-up in 4 weeks or sooner as needed for a significant change in her vision or diplopia.    Again, thank you for trusting me with the care of your patient.  For further exam details, please feel free to contact our office for additional records.  If you wish to contact me regarding this patient please email me at [email or web address] or give my clinic a call to  arrange a phone conversation.    Sincerely,    Jared Moreland MD  , Neuro-Ophthalmology and Adult Strabismus  Department of Ophthalmology and Visual Neurosciences  [facility]    DX: new hypertropia

## 2018-08-24 ENCOUNTER — APPOINTMENT (OUTPATIENT)
Dept: LAB | Facility: CLINIC | Age: 43
End: 2018-08-24
Attending: OPHTHALMOLOGY
Payer: COMMERCIAL

## 2018-08-24 ENCOUNTER — HOSPITAL ENCOUNTER (OUTPATIENT)
Dept: MRI IMAGING | Facility: CLINIC | Age: 43
End: 2018-08-24
Attending: OPHTHALMOLOGY
Payer: COMMERCIAL

## 2018-08-24 ENCOUNTER — HOSPITAL ENCOUNTER (OUTPATIENT)
Dept: MRI IMAGING | Facility: CLINIC | Age: 43
Discharge: HOME | End: 2018-08-24
Attending: OPHTHALMOLOGY | Admitting: OPHTHALMOLOGY
Payer: COMMERCIAL

## 2018-08-24 DIAGNOSIS — H50.22 HYPERTROPIA OF LEFT EYE: ICD-10-CM

## 2018-08-24 PROCEDURE — 70551 MRI BRAIN STEM W/O DYE: CPT

## 2018-08-24 PROCEDURE — 70540 MRI ORBIT/FACE/NECK W/O DYE: CPT

## 2018-08-26 DIAGNOSIS — H53.2 DIPLOPIA: Primary | ICD-10-CM

## 2018-08-29 ENCOUNTER — TELEPHONE (OUTPATIENT)
Dept: OPHTHALMOLOGY | Facility: CLINIC | Age: 43
End: 2018-08-29

## 2018-08-29 NOTE — TELEPHONE ENCOUNTER
Spoke to patient about MRI results.  Also asked her to get labs, and she stated she has not had time.  She will try to get them from [facility] this week.

## 2018-08-31 ENCOUNTER — RADIANT APPOINTMENT (OUTPATIENT)
Dept: ULTRASOUND IMAGING | Facility: CLINIC | Age: 43
End: 2018-08-31
Attending: OBSTETRICS & GYNECOLOGY
Payer: COMMERCIAL

## 2018-08-31 ENCOUNTER — PRENATAL OFFICE VISIT (OUTPATIENT)
Dept: OBGYN | Facility: CLINIC | Age: 43
End: 2018-08-31
Payer: COMMERCIAL

## 2018-08-31 ENCOUNTER — TELEPHONE (OUTPATIENT)
Dept: OBGYN | Facility: CLINIC | Age: 43
End: 2018-08-31

## 2018-08-31 VITALS
DIASTOLIC BLOOD PRESSURE: 90 MMHG | BODY MASS INDEX: 37.36 KG/M2 | HEART RATE: 70 BPM | WEIGHT: 206.9 LBS | SYSTOLIC BLOOD PRESSURE: 138 MMHG

## 2018-08-31 DIAGNOSIS — Z86.2 HISTORY OF ANEMIA: ICD-10-CM

## 2018-08-31 DIAGNOSIS — H53.2 DIPLOPIA: ICD-10-CM

## 2018-08-31 DIAGNOSIS — R03.0 ELEVATED BLOOD PRESSURE READING WITHOUT DIAGNOSIS OF HYPERTENSION: ICD-10-CM

## 2018-08-31 DIAGNOSIS — E05.90 SUBCLINICAL HYPERTHYROIDISM: ICD-10-CM

## 2018-08-31 DIAGNOSIS — Z36.87 UNSURE OF LMP (LAST MENSTRUAL PERIOD) AS REASON FOR ULTRASOUND SCAN: ICD-10-CM

## 2018-08-31 DIAGNOSIS — O09.522 ELDERLY MULTIGRAVIDA IN SECOND TRIMESTER: Primary | ICD-10-CM

## 2018-08-31 LAB — RADIOLOGIST FLAGS: NORMAL

## 2018-08-31 PROCEDURE — 83519 RIA NONANTIBODY: CPT | Mod: 90 | Performed by: OPHTHALMOLOGY

## 2018-08-31 PROCEDURE — 81511 FTL CGEN ABNOR FOUR ANAL: CPT | Mod: 90 | Performed by: OBSTETRICS & GYNECOLOGY

## 2018-08-31 PROCEDURE — 99207 ZZC COMPLICATED OB VISIT: CPT | Performed by: OBSTETRICS & GYNECOLOGY

## 2018-08-31 PROCEDURE — 84445 ASSAY OF TSI GLOBULIN: CPT | Mod: 90 | Performed by: OPHTHALMOLOGY

## 2018-08-31 PROCEDURE — 36415 COLL VENOUS BLD VENIPUNCTURE: CPT | Performed by: OPHTHALMOLOGY

## 2018-08-31 PROCEDURE — 99000 SPECIMEN HANDLING OFFICE-LAB: CPT | Performed by: OPHTHALMOLOGY

## 2018-08-31 PROCEDURE — 76805 OB US >/= 14 WKS SNGL FETUS: CPT | Performed by: STUDENT IN AN ORGANIZED HEALTH CARE EDUCATION/TRAINING PROGRAM

## 2018-08-31 RX ORDER — FERROUS SULFATE 325(65) MG
325 TABLET ORAL
Qty: 90 TABLET | Refills: 3 | Status: SHIPPED | OUTPATIENT
Start: 2018-08-31 | End: 2018-12-28

## 2018-08-31 NOTE — TELEPHONE ENCOUNTER
Attempted to reach pt to relay Dr. Stafford's below message via  services.  Pt did not answer,  -Kishore, left message for pt to return call to clinic.    Kayleigh Loyd RN

## 2018-08-31 NOTE — TELEPHONE ENCOUNTER
Please call the patient and let her know the ultrasound showed the placenta was close to the opening of the cervix.  I recommend she abstain from intercourse until we are able to look at the ultrasound again next month.

## 2018-08-31 NOTE — PROGRESS NOTES
Presents for routine  appointment.    She is feeling tired.    No LOF/VB/Ctxs.    ROS:   and GI  negative.     Please see Prenatal Vitals and Notes Flowsheet for objective data.  TSH   Date Value Ref Range Status   2018 0.19 (L) 0.40 - 4.00 mU/L Final      Hemoglobin   Date Value Ref Range Status   2018 11.3 (L) 11.7 - 15.7 g/dL Final   ]     A/P:  43 year old  at 16w6d       ICD-10-CM    1. Elderly multigravida in second trimester O09.522 Maternal Quad Marker 2nd Trimester   2. History of anemia Z86.2 ferrous sulfate (IRON) 325 (65 Fe) MG tablet   3. Subclinical hyperthyroidism E05.90    4. Elevated blood pressure reading without diagnosis of hypertension R03.0        Elevated BP  - normal CMP 8/3/18.    Discussed EGA by US done today.  Plan to adjust due date. Estimated Date of Delivery: 2019 .    Genetic screening - plan Level 2 US with MFM  She has a follow up appointment in a few weeks with Neuro and she was encouraged to keep that appointment.   Follow up in 4  weeks.      Kylie Stafford MD

## 2018-08-31 NOTE — NURSING NOTE
"Chief Complaint   Patient presents with     Prenatal Care       Initial /90 (BP Location: Right arm, Patient Position: Chair, Cuff Size: Adult Regular)  Pulse 70  Wt 206 lb 14.4 oz (93.8 kg)  LMP 2018 (Exact Date)  BMI 37.36 kg/m2 Estimated body mass index is 37.36 kg/(m^2) as calculated from the following:    Height as of 8/3/18: 5' 2.4\" (1.585 m).    Weight as of this encounter: 206 lb 14.4 oz (93.8 kg).  BP completed using cuff size: regular        Trish Lopez, JOSIE  2018          "

## 2018-08-31 NOTE — MR AVS SNAPSHOT
After Visit Summary   8/31/2018    Charity PALMER    MRN: [id number]           Patient Information     Date Of Birth          1975        Visit Information        Provider Department      8/31/2018 10:45 AM Kylie Stafford MD; [facility] [facility]        Today's Diagnoses     Elderly multigravida in second trimester    -  1    History of anemia        Subclinical hyperthyroidism        Elevated blood pressure reading without diagnosis of hypertension           Follow-ups after your visit        Follow-up notes from your care team     Return in about 4 weeks (around 9/28/2018) for OB Visit.      Your next 10 appointments already scheduled     Sep 19, 2018 12:30 PM CDT   RETURN NEURO with Jared Moreland MD   Eye Clinic ([facility])    [address]  [facility] [id number] [phone]            Oct 05, 2018  3:00 PM CDT   ESTABLISHED PRENATAL with Kylie Stafford MD   [facility] ([facility])    [address] [place] [address] [phone]              Who to contact     If you have questions or need follow up information about today's clinic visit or your schedule please contact [facility] directly at [phone].  Normal or non-critical lab and imaging results will be communicated to you by MyChart, letter or phone within 4 business days after the clinic has received the results. If you do not hear from us within 7 days, please contact the clinic through MyChart or phone. If you have a critical or abnormal lab result, we will notify you by phone as soon as possible.  Submit refill requests through [employer] or call your pharmacy and they will forward the refill request to us. Please allow 3 business days for your refill to be completed.          Additional Information About Your Visit        Care  EveryWhere ID     This is your Care EveryWhere ID. This could be used by other organizations to access your [place] medical records  [id number]        Your Vitals Were     Pulse Last Period BMI (Body Mass Index)             70 05/23/2018 (Exact Date) 37.36 kg/m2          Blood Pressure from Last 3 Encounters:   08/31/18 138/90   08/03/18 (!) 133/94   07/17/18 121/84    Weight from Last 3 Encounters:   08/31/18 206 lb 14.4 oz (93.8 kg)   08/03/18 207 lb 9.6 oz (94.2 kg)   07/17/18 209 lb 14.4 oz (95.2 kg)              We Performed the Following     Maternal Quad Marker 2nd Trimester          Today's Medication Changes          These changes are accurate as of 8/31/18 11:55 AM.  If you have any questions, ask your nurse or doctor.               Start taking these medicines.        Dose/Directions    ferrous sulfate 325 (65 Fe) MG tablet   Commonly known as:  IRON   Used for:  History of anemia   Started by:  Kylie Stafford MD        Dose:  325 mg   Take 1 tablet (325 mg) by mouth daily (with breakfast)   Quantity:  90 tablet   Refills:  3            Where to get your medicines      These medications were sent to [facility]/pharmacy #[id number] - [place], [place] - [address]  [address] [address]     Phone:  [phone]     ferrous sulfate 325 (65 Fe) MG tablet                Primary Care Provider Fax #    Physician No Ref-Primary [phone]       No address on file        Equal Access to Services     [facility] : Hadii travis marieo Soflorentin, waaxda luqadaha, qaybta kaalmada iveth, lindsay pink . So [facility] [phone].    ATENCIÓN: Si habla español, tiene a lopez disposición servicios gratuitos de asistencia lingüística. Llame al [phone].    We comply with applicable federal civil rights laws and [place] laws. We do not discriminate on the basis of race, color, national origin, age, disability, sex, sexual orientation, or gender identity.             Thank you!     Thank you for choosing [facility]  for your care. Our goal is always to provide you with excellent care. Hearing back from our patients is one way we can continue to improve our services. Please take a few minutes to complete the written survey that you may receive in the mail after your visit with us. Thank you!             Your Updated Medication List - Protect others around you: Learn how to safely use, store and throw away your medicines at www.disposemymeds.org.          This list is accurate as of 8/31/18 11:55 AM.  Always use your most recent med list.                   Brand Name Dispense Instructions for use Diagnosis    ferrous sulfate 325 (65 Fe) MG tablet    IRON    90 tablet    Take 1 tablet (325 mg) by mouth daily (with breakfast)    History of anemia       fluticasone 50 MCG/ACT spray    FLONASE    1 Bottle    Spray 1-2 sprays into both nostrils daily    Acute allergic rhinitis due to pollen, unspecified seasonality

## 2018-09-03 LAB
# FETUSES US: NORMAL
# FETUSES: NORMAL
AFP ADJ MOM AMN: 0.58
AFP SERPL-MCNC: 17 NG/ML
AGE - REPORTED: 41.7 YR
CURRENT SMOKER: NO
CURRENT SMOKER: NO
DIABETES STATUS PATIENT: NO
FAMILY MEMBER DISEASES HX: NO
FAMILY MEMBER DISEASES HX: NO
GA METHOD: NORMAL
GA METHOD: NORMAL
GA: NORMAL WK
HCG MOM SERPL: 1.26
HCG SERPL-ACNC: NORMAL IU/L
HX OF HEREDITARY DISORDERS: NO
IDDM PATIENT QL: NO
INHIBIN A MOM SERPL: 0.67
INHIBIN A SERPL-MCNC: 94 PG/ML
INTEGRATED SCN PATIENT-IMP: NORMAL
IVF PREGNANCY: NO
LMP START DATE: NORMAL
MONOCHORIONIC TWINS: NO
PATHOLOGY STUDY: NORMAL
PREV FETUS DEFECT: NO
SERVICE CMNT-IMP: NO
SPECIMEN DRAWN SERPL: NORMAL
U ESTRIOL MOM SERPL: 1.02
U ESTRIOL SERPL-MCNC: 1.01 NG/ML
VALPROIC/CARBAMAZEPINE STATUS: NO
WEIGHT UNITS: NORMAL

## 2018-09-04 ENCOUNTER — MEDICAL CORRESPONDENCE (OUTPATIENT)
Dept: HEALTH INFORMATION MANAGEMENT | Facility: CLINIC | Age: 43
End: 2018-09-04

## 2018-09-04 ENCOUNTER — TELEPHONE (OUTPATIENT)
Dept: OBGYN | Facility: CLINIC | Age: 43
End: 2018-09-04

## 2018-09-04 DIAGNOSIS — O28.0 ABNORMAL QUAD SCREEN: ICD-10-CM

## 2018-09-04 NOTE — TELEPHONE ENCOUNTER
Referral faxed to [facility] along with MQS results.  Spoke with [facility]- notified of 2nd referral being sent and patient to be scheduled for both.  M understood.

## 2018-09-04 NOTE — TELEPHONE ENCOUNTER
Called patient using [employer] interpretive services to discuss abnormal quad screen, which was positive for increased risk of Down syndrome.     Patient has already been referred to [facility] for level 2 US due to AMA.  She has not yet been scheduled with them.    Referral form filled out again due to abnormal Quad.  Please contact [facility] and let them know that there is a second referral form so they are able to schedule the patient appropriately.      Patient had no further questions and will wait to hear from the [facility] team.

## 2018-09-05 NOTE — TELEPHONE ENCOUNTER
Relayed Dr. Stafford's below message to pt.  She verbalized understanding and agreed to plan.    Kayleigh Loyd RN

## 2018-09-06 LAB — TSI SER-ACNC: <1 TSI INDEX

## 2018-09-07 LAB — ACHR BIND AB SER-SCNC: 0 NMOL/L (ref 0–0.4)

## 2018-09-18 ENCOUNTER — TRANSFERRED RECORDS (OUTPATIENT)
Dept: HEALTH INFORMATION MANAGEMENT | Facility: CLINIC | Age: 43
End: 2018-09-18

## 2018-09-18 DIAGNOSIS — H53.2 DOUBLE VISION: Primary | ICD-10-CM

## 2018-09-24 PROBLEM — O35.DXX9: Status: ACTIVE | Noted: 2018-09-24

## 2018-10-05 ENCOUNTER — PRENATAL OFFICE VISIT (OUTPATIENT)
Dept: OBGYN | Facility: CLINIC | Age: 43
End: 2018-10-05
Payer: COMMERCIAL

## 2018-10-05 VITALS
HEART RATE: 86 BPM | WEIGHT: 211.2 LBS | BODY MASS INDEX: 38.13 KG/M2 | SYSTOLIC BLOOD PRESSURE: 135 MMHG | DIASTOLIC BLOOD PRESSURE: 85 MMHG

## 2018-10-05 DIAGNOSIS — O09.522 ELDERLY MULTIGRAVIDA IN SECOND TRIMESTER: Primary | ICD-10-CM

## 2018-10-05 DIAGNOSIS — E05.90 SUBCLINICAL HYPERTHYROIDISM: ICD-10-CM

## 2018-10-05 DIAGNOSIS — O28.0 ABNORMAL QUAD SCREEN: ICD-10-CM

## 2018-10-05 DIAGNOSIS — O35.DXX9: ICD-10-CM

## 2018-10-05 PROCEDURE — 99207 ZZC COMPLICATED OB VISIT: CPT | Performed by: OBSTETRICS & GYNECOLOGY

## 2018-10-05 NOTE — PROGRESS NOTES
Presents for routine  appointment.     No complaints aside from low back pain and some discharge.  .    No LOF/VB/Ctxs.    ROS:   and GI  negative.     Please see Prenatal Vitals and Notes Flowsheet for objective data.    A/P:  43 year old  at 21w6d       ICD-10-CM    1. Elderly multigravida in second trimester O09.522 Treponema Abs w Reflex to RPR and Titer     OB hemoglobin: FUTURE     Glucose Challenge Test (GCT)1 Hour, (Future)   2. Abnormal quad screen O28.0    3. Subclinical hyperthyroidism E05.90 TSH with free T4 reflex   4. Pregnancy complicated by fetal GI abnormality, other fetus O35.8XX9        History of mildly elevated BP at her first OB visits.  No history of GHTN.  Discussed possible use of ASA but we will hold off at this time.    Prominent stomach bubble on MFM US. Elevated risk of Down Syndrome on Quad screen. Repeat US in Oct.  Follow up in 4  weeks.      Kylie Stafford MD

## 2018-10-05 NOTE — NURSING NOTE
"Chief Complaint   Patient presents with     Prenatal Care       Initial /85 (BP Location: Right arm, Patient Position: Chair, Cuff Size: Adult Regular)  Pulse 86  Wt 211 lb 3.2 oz (95.8 kg)  LMP 2018 (Exact Date)  BMI 38.13 kg/m2 Estimated body mass index is 38.13 kg/(m^2) as calculated from the following:    Height as of 8/3/18: 5' 2.4\" (1.585 m).    Weight as of this encounter: 211 lb 3.2 oz (95.8 kg).  BP completed using cuff size: regular        Trish Lopez, JOSIE  2018          "

## 2018-10-05 NOTE — MR AVS SNAPSHOT
After Visit Summary   10/5/2018    Charity PALMER    MRN: [id number]           Patient Information     Date Of Birth          1975        Visit Information        Provider Department      10/5/2018 2:45 PM Kylie Stafford MD; [place] [employer] [facility]        Today's Diagnoses     Elderly multigravida in second trimester    -  1    Abnormal quad screen        Subclinical hyperthyroidism        Pregnancy complicated by fetal GI abnormality, other fetus           Follow-ups after your visit        Follow-up notes from your care team     Return in about 4 weeks (around 11/2/2018) for OB Visit.      Future tests that were ordered for you today     Open Future Orders        Priority Expected Expires Ordered    TSH with free T4 reflex Routine  10/5/2019 10/5/2018    Treponema Abs w Reflex to RPR and Titer Routine  10/5/2019 10/5/2018    OB hemoglobin: FUTURE Routine  4/3/2019 10/5/2018    Glucose Challenge Test (GCT)1 Hour, (Future) Routine  11/30/2018 10/5/2018            Who to contact     If you have questions or need follow up information about today's clinic visit or your schedule please contact [facility] directly at [phone].  Normal or non-critical lab and imaging results will be communicated to you by MyChart, letter or phone within 4 business days after the clinic has received the results. If you do not hear from us within 7 days, please contact the clinic through MyChart or phone. If you have a critical or abnormal lab result, we will notify you by phone as soon as possible.  Submit refill requests through OP3Nvoice or call your pharmacy and they will forward the refill request to us. Please allow 3 business days for your refill to be completed.          Additional Information About Your Visit        Care EveryWhere ID     This is your Care EveryWhere ID. This could be used by other organizations to access your [facility]  records  [id number]        Your Vitals Were     Pulse Last Period BMI (Body Mass Index)             86 05/23/2018 (Exact Date) 38.13 kg/m2          Blood Pressure from Last 3 Encounters:   10/05/18 135/85   08/31/18 138/90   08/03/18 (!) 133/94    Weight from Last 3 Encounters:   10/05/18 211 lb 3.2 oz (95.8 kg)   08/31/18 206 lb 14.4 oz (93.8 kg)   08/03/18 207 lb 9.6 oz (94.2 kg)               Primary Care Provider Fax #    Physician No Ref-Primary [phone]       No address on file        Equal Access to Services     MILADIS COTTON : Gaurav Guzmán, martha frances, deepa lazaro, lindsay mayfield. So [facility] [phone].    ATENCIÓN: Si habla español, tiene a lopez disposición servicios gratuitos de asistencia lingüística. Llame al [phone].    We comply with applicable federal civil rights laws and [place] laws. We do not discriminate on the basis of race, color, national origin, age, disability, sex, sexual orientation, or gender identity.            Thank you!     Thank you for choosing [facility]  for your [place]. Our goal is always to provide you with excellent care. Hearing back from our patients is one way we can continue to improve our services. Please take a few minutes to complete the written survey that you may receive in the mail after your visit with us. Thank you!             Your Updated Medication List - Protect others around you: Learn how to safely use, store and throw away your medicines at www.disposemymeds.org.          This list is accurate as of 10/5/18  3:20 PM.  Always use your most recent med list.                   Brand Name Dispense Instructions for use Diagnosis    ferrous sulfate 325 (65 Fe) MG tablet    IRON    90 tablet    Take 1 tablet (325 mg) by mouth daily (with breakfast)    History of anemia       fluticasone 50 MCG/ACT spray    FLONASE    1 Bottle    Spray 1-2 sprays into both nostrils daily    Acute  allergic rhinitis due to pollen, unspecified seasonality

## 2018-10-19 ENCOUNTER — TRANSFERRED RECORDS (OUTPATIENT)
Dept: HEALTH INFORMATION MANAGEMENT | Facility: CLINIC | Age: 43
End: 2018-10-19

## 2018-11-02 ENCOUNTER — PRENATAL OFFICE VISIT (OUTPATIENT)
Dept: OBGYN | Facility: CLINIC | Age: 43
End: 2018-11-02
Payer: COMMERCIAL

## 2018-11-02 VITALS
BODY MASS INDEX: 38.26 KG/M2 | WEIGHT: 211.9 LBS | HEART RATE: 82 BPM | DIASTOLIC BLOOD PRESSURE: 85 MMHG | SYSTOLIC BLOOD PRESSURE: 130 MMHG

## 2018-11-02 DIAGNOSIS — O12.02 SWELLING OF LOWER EXTREMITY DURING PREGNANCY IN SECOND TRIMESTER: ICD-10-CM

## 2018-11-02 DIAGNOSIS — E05.90 SUBCLINICAL HYPERTHYROIDISM: ICD-10-CM

## 2018-11-02 DIAGNOSIS — O09.522 ELDERLY MULTIGRAVIDA IN SECOND TRIMESTER: Primary | ICD-10-CM

## 2018-11-02 DIAGNOSIS — O28.0 ABNORMAL QUAD SCREEN: ICD-10-CM

## 2018-11-02 LAB
GLUCOSE 1H P 50 G GLC PO SERPL-MCNC: 119 MG/DL (ref 60–129)
HGB BLD-MCNC: 9.3 G/DL (ref 11.7–15.7)
TSH SERPL DL<=0.005 MIU/L-ACNC: 0.82 MU/L (ref 0.4–4)

## 2018-11-02 PROCEDURE — 84443 ASSAY THYROID STIM HORMONE: CPT | Performed by: OBSTETRICS & GYNECOLOGY

## 2018-11-02 PROCEDURE — 00000218 ZZHCL STATISTIC OBHBG - HEMOGLOBIN: Performed by: OBSTETRICS & GYNECOLOGY

## 2018-11-02 PROCEDURE — 99207 ZZC COMPLICATED OB VISIT: CPT | Performed by: OBSTETRICS & GYNECOLOGY

## 2018-11-02 PROCEDURE — 86780 TREPONEMA PALLIDUM: CPT | Performed by: OBSTETRICS & GYNECOLOGY

## 2018-11-02 PROCEDURE — 82950 GLUCOSE TEST: CPT | Performed by: OBSTETRICS & GYNECOLOGY

## 2018-11-02 PROCEDURE — 36415 COLL VENOUS BLD VENIPUNCTURE: CPT | Performed by: OBSTETRICS & GYNECOLOGY

## 2018-11-02 ASSESSMENT — PATIENT HEALTH QUESTIONNAIRE - PHQ9: SUM OF ALL RESPONSES TO PHQ QUESTIONS 1-9: 5

## 2018-11-02 NOTE — NURSING NOTE
"Chief Complaint   Patient presents with     Prenatal Care       Initial /85 (BP Location: Right arm, Patient Position: Chair, Cuff Size: Adult Regular)  Pulse 82  Wt 211 lb 14.4 oz (96.1 kg)  LMP 2018 (Exact Date)  BMI 38.26 kg/m2 Estimated body mass index is 38.26 kg/(m^2) as calculated from the following:    Height as of 8/3/18: 5' 2.4\" (1.585 m).    Weight as of this encounter: 211 lb 14.4 oz (96.1 kg).  BP completed using cuff size: regular        Trish Lopez, JOSIE  2018          "

## 2018-11-02 NOTE — MR AVS SNAPSHOT
After Visit Summary   2018    Charity PALMER    MRN: [id number]           Patient Information     Date Of Birth          1975        Visit Information        Provider Department      2018 2:30 PM Kylie Stafford MD; PH[place],  [facility]        Today's Diagnoses     Elderly multigravida in second trimester    -  1    Abnormal quad screen        Subclinical hyperthyroidism          Care Instructions    SIGNS OF  LABOR    Labor is  if it happens more than three weeks before your due date.    It can be hard to know if you are in labor, since the symptoms can be like the normal feelings of pregnancy.  Often, the only difference is the symptoms increase or they don't go away.     Signs of  labor can include:    Change in your vaginal discharge:  You will have more vaginal discharge when you are pregnant and it should be creamy white.  Call the clinic right away if your discharge has a foul odor, pink or bloody,  or if it becomes watery or is more than is normal for you during your pregnancy.    More than 5-6 contractions or tightenings per hour.  Contractions can feel like period cramps or bowel (gas or diarrhea) pain.  You will feel it in the lower part of your abdomen, in your back or as a pressure feeling in your bottom.  It is often regular, coming for 30 seconds or a minute and then going away, only to come back 5 or 10 minutes later. Some contractions are normal during pregnancy, but if you are feeling more than 5-6 in one hour, empty your bladder, then drink 16-24 ounces of water, eat a snack and lay down on your left side. Put your hand on your abdomen to count the contractions.  If after one hour of resting you have still had 5-6 contractions call your clinic.             Follow-ups after your visit        Follow-up notes from your care team     Return in about 4 weeks (around 2018) for OB Visit.      Your next 10  "appointments already scheduled     2018  9:00 AM CST   Ech Fetal Complete* with [id number]   [facility] Echo/EKG ([facility])    [address]  [place] [address]                 Who to contact     If you have questions or need follow up information about today's clinic visit or your schedule please contact [facility] directly at [phone].  Normal or non-critical lab and imaging results will be communicated to you by MaxxAthletehart, letter or phone within 4 business days after the clinic has received the results. If you do not hear from us within 7 days, please contact the clinic through MaxxAthletehart or phone. If you have a critical or abnormal lab result, we will notify you by phone as soon as possible.  Submit refill requests through [employer] or call your pharmacy and they will forward the refill request to us. Please allow 3 business days for your refill to be completed.          Additional Information About Your Visit        MaxxAthleteharQwiki Information     Enerplant lets you send messages to your doctor, view your test results, renew your prescriptions, schedule appointments and more. To sign up, go to [email or web address] . Click on \"Log in\" on the left side of the screen, which will take you to the Welcome page. Then click on \"Sign up Now\" on the right side of the page.     You will be asked to enter the access code listed below, as well as some personal information. Please follow the directions to create your username and password.     Your access code is: [email or web address]  Expires: 2019  2:44 PM     Your access code will  in 90 days. If you need help or a new code, please call your [place] clinic or [phone].        Care EveryWhere ID     This is your Care EveryWhere ID. This could be used by other organizations to access your [place] [facility] medical records  [id number]        Your Vitals Were     Pulse Last Period BMI (Body Mass Index)             82 2018 " (Exact Date) 38.26 kg/m2          Blood Pressure from Last 3 Encounters:   11/02/18 130/85   10/05/18 135/85   08/31/18 138/90    Weight from Last 3 Encounters:   11/02/18 211 lb 14.4 oz (96.1 kg)   10/05/18 211 lb 3.2 oz (95.8 kg)   08/31/18 206 lb 14.4 oz (93.8 kg)              We Performed the Following     Glucose Challenge Test (GCT)1 Hour, (Future)     OB hemoglobin: FUTURE     Treponema Abs w Reflex to RPR and Titer     TSH with free T4 reflex        Primary Care Provider Fax #    Physician No Ref-Primary [phone]       No address on file        Equal Access to Services     MILADIS COTTON : Gaurav Guzmán, martha frances, deepa lazaro, lindsay mayfield. So [facility] [phone].    ATENCIÓN: Si habla español, tiene a lopez disposición servicios gratuitos de asistencia lingüística. Llame al [phone].    We comply with applicable federal civil rights laws and [place] laws. We do not discriminate on the basis of race, color, national origin, age, disability, sex, sexual orientation, or gender identity.            Thank you!     Thank you for choosing [facility]  for your care. Our goal is always to provide you with excellent care. Hearing back from our patients is one way we can continue to improve our services. Please take a few minutes to complete the written survey that you may receive in the mail after your visit with us. Thank you!             Your Updated Medication List - Protect others around you: Learn how to safely use, store and throw away your medicines at www.disposemymeds.org.          This list is accurate as of 11/2/18  2:44 PM.  Always use your most recent med list.                   Brand Name Dispense Instructions for use Diagnosis    ferrous sulfate 325 (65 Fe) MG tablet    IRON    90 tablet    Take 1 tablet (325 mg) by mouth daily (with breakfast)    History of anemia       fluticasone 50 MCG/ACT spray    FLONASE    1  Bottle    Spray 1-2 sprays into both nostrils daily    Acute allergic rhinitis due to pollen, unspecified seasonality

## 2018-11-02 NOTE — PATIENT INSTRUCTIONS
SIGNS OF  LABOR    Labor is  if it happens more than three weeks before your due date.    It can be hard to know if you are in labor, since the symptoms can be like the normal feelings of pregnancy.  Often, the only difference is the symptoms increase or they don't go away.     Signs of  labor can include:    Change in your vaginal discharge:  You will have more vaginal discharge when you are pregnant and it should be creamy white.  Call the clinic right away if your discharge has a foul odor, pink or bloody,  or if it becomes watery or is more than is normal for you during your pregnancy.    More than 5-6 contractions or tightenings per hour.  Contractions can feel like period cramps or bowel (gas or diarrhea) pain.  You will feel it in the lower part of your abdomen, in your back or as a pressure feeling in your bottom.  It is often regular, coming for 30 seconds or a minute and then going away, only to come back 5 or 10 minutes later. Some contractions are normal during pregnancy, but if you are feeling more than 5-6 in one hour, empty your bladder, then drink 16-24 ounces of water, eat a snack and lay down on your left side. Put your hand on your abdomen to count the contractions.  If after one hour of resting you have still had 5-6 contractions call your clinic.

## 2018-11-02 NOTE — PROGRESS NOTES
Presents for routine  appointment.    She feels occasional pressure, pulling in her private area.  Chest sharpness, like an ant is biting her.  No heart or lung concerns.  She elects observation.   No LOF/VB/Ctxs.    ROS:   and GI otherwise negative.     Patient Active Problem List    Diagnosis Date Noted     Pregnancy complicated by fetal GI abnormality, other fetus 2018     Priority: Medium     Prominent stomach bubble on M US.  Repeat US in Oct.       Abnormal quad screen 2018     Priority: Medium     Elevated risk of Down Syndrome       AMA (advanced maternal age) multigravida 35+ 2018     Priority: Medium     Subclinical hyperthyroidism 2018     Priority: Medium     Monocular diplopia of left eye 2018     Priority: Medium     History of spontaneous  2018     Priority: Medium     Elevated blood pressure reading without diagnosis of hypertension 2018     Priority: Medium       Please see Prenatal Vitals and Notes Flowsheet for objective data.    A/P:  43 year old  at 25w6d     Abnormal quad screen with normal cfDNA    Suboptimal views of fetal heart, fetal echo scheduled for 2018    Fetal stomach appeared normal on 10/19/2018    Mild polyhydramnios.  Follow up with MFM.      GCT today    History of hyperthyroidism.  Repeat TSH today.    Tdap next visit    Pelvic pressure - no symptoms of  labor at this time.  Recommend observation.  She will let us know if her symptoms worsen or do not improve.       Compression stockings ordered    Follow up in 4 week(s).    Seen with  (ipad)    Kylie Stafford MD

## 2018-11-03 LAB — T PALLIDUM AB SER QL: NONREACTIVE

## 2018-11-05 ENCOUNTER — TELEPHONE (OUTPATIENT)
Dept: OBGYN | Facility: CLINIC | Age: 43
End: 2018-11-05

## 2018-11-05 NOTE — TELEPHONE ENCOUNTER
Phone call placed to patient using [employer], client # [id number]. Gave results and orders per Dr. Stafford.     Notes Recorded by Kylie Stafford MD on 11/5/2018 at 8:23 AM    Please let the patient know her glucose test was normal.  Her thyroid test was normal.  Hgb is low.   I recommend she start an iron supplement.  she can take an iron supplement over the counter.  Usually it is FeSO4 325 mg, one tab by mouth daily.  Iron can make her constipated, so she can take a stool softener if needed.  Also it can make her stools dark.  Iron absorption is increased if she take it with orange juice.     needed    Patient stated she is not taking PNV as she does not have an Rx. Patient stated she took PNV with previous pregnancies but has not this time as no Rx. Explained will get a message to Dr. Stafford to request an Rx. Pharmacy verified.  Patient stated Dr. Stafford had given her an Rx for FeSO4 and it made her feel nauseated and caused vomiting. Patient stated she only attempted taking iron for 3 days and then stopped.   ferrous sulfate (IRON) 325 (65 Fe) MG tablet 90 tablet 3 8/31/2018  --   Sig - Route: Take 1 tablet (325 mg) by mouth daily (with breakfast)    Gave patient several ideas on foods high in iron so that she can attempt to get some more iron in her system.    Explained will get a message to Dr. Stafford and call back with her orders.   Will route to Dr. Stafford for review & orders. Jannie Carbone RN, MARIE

## 2018-11-05 NOTE — PROGRESS NOTES
Please let the patient know her glucose test was normal.  Her thyroid test was normal.  Hgb is low.   I recommend she start an iron supplement.  she can take an iron supplement over the counter.  Usually it is FeSO4 325 mg, one tab by mouth daily.  Iron can make her constipated, so she can take a stool softener if needed.  Also it can make her stools dark.  Iron absorption is increased if she take it with orange juice.     needed.

## 2018-11-30 ENCOUNTER — PRENATAL OFFICE VISIT (OUTPATIENT)
Dept: OBGYN | Facility: CLINIC | Age: 43
End: 2018-11-30
Payer: COMMERCIAL

## 2018-11-30 VITALS
WEIGHT: 211.4 LBS | SYSTOLIC BLOOD PRESSURE: 125 MMHG | BODY MASS INDEX: 38.17 KG/M2 | DIASTOLIC BLOOD PRESSURE: 81 MMHG | HEART RATE: 86 BPM

## 2018-11-30 DIAGNOSIS — O99.013 ANEMIA AFFECTING PREGNANCY IN THIRD TRIMESTER: Primary | ICD-10-CM

## 2018-11-30 LAB
ERYTHROCYTE [DISTWIDTH] IN BLOOD BY AUTOMATED COUNT: 14.7 % (ref 10–15)
FERRITIN SERPL-MCNC: 2 NG/ML (ref 12–150)
HCT VFR BLD AUTO: 29.4 % (ref 35–47)
HGB BLD-MCNC: 9.1 G/DL (ref 11.7–15.7)
MCH RBC QN AUTO: 23.9 PG (ref 26.5–33)
MCHC RBC AUTO-ENTMCNC: 31 G/DL (ref 31.5–36.5)
MCV RBC AUTO: 77 FL (ref 78–100)
PLATELET # BLD AUTO: 307 10E9/L (ref 150–450)
RBC # BLD AUTO: 3.8 10E12/L (ref 3.8–5.2)
WBC # BLD AUTO: 7.7 10E9/L (ref 4–11)

## 2018-11-30 PROCEDURE — 82728 ASSAY OF FERRITIN: CPT | Performed by: OBSTETRICS & GYNECOLOGY

## 2018-11-30 PROCEDURE — 36415 COLL VENOUS BLD VENIPUNCTURE: CPT | Performed by: OBSTETRICS & GYNECOLOGY

## 2018-11-30 PROCEDURE — 99207 ZZC COMPLICATED OB VISIT: CPT | Performed by: OBSTETRICS & GYNECOLOGY

## 2018-11-30 PROCEDURE — 85027 COMPLETE CBC AUTOMATED: CPT | Performed by: OBSTETRICS & GYNECOLOGY

## 2018-11-30 NOTE — PROGRESS NOTES
Presents for routine  appointment.    Iron pills make her nauseated.  She is tired  No LOF/VB/Ctxs.    ROS:   and GI  negative.     Please see Prenatal Vitals and Notes Flowsheet for objective data.    A/P:  43 year old  at 29w6d       ICD-10-CM    1. Anemia affecting pregnancy in third trimester O99.013 CBC with platelets     Ferritin       Encouraged her to reschedule her appointment with MFM and ECHO.  GCT Normal.  Hgb 9.3.  Plan anemia studies today.    TDAP today.    Rhogam: not  needed  Discussed kick counts   Follow up in 2 weeks.      Kylie Stafford MD

## 2018-11-30 NOTE — NURSING NOTE
"Chief Complaint   Patient presents with     Prenatal Care       Initial /81 (BP Location: Right arm, Patient Position: Chair, Cuff Size: Adult Regular)  Pulse 86  Wt 211 lb 6.4 oz (95.9 kg)  LMP 2018 (Exact Date)  BMI 38.17 kg/m2 Estimated body mass index is 38.17 kg/(m^2) as calculated from the following:    Height as of 8/3/18: 5' 2.4\" (1.585 m).    Weight as of this encounter: 211 lb 6.4 oz (95.9 kg).  BP completed using cuff size: regular        Trish Lopez, JOSIE  2018          "

## 2018-11-30 NOTE — MR AVS SNAPSHOT
"              After Visit Summary   2018    Charity PALMER    MRN: [id number]           Patient Information     Date Of Birth          1975        Visit Information        Provider Department      2018 2:00 PM Kylie Stafford MD; [place] [facility]        Today's Diagnoses     Anemia affecting pregnancy in third trimester    -  1       Follow-ups after your visit        Follow-up notes from your care team     Return in about 2 weeks (around 2018) for OB Visit.      Who to contact     If you have questions or need follow up information about today's clinic visit or your schedule please contact [facility] directly at [phone].  Normal or non-critical lab and imaging results will be communicated to you by MyChart, letter or phone within 4 business days after the clinic has received the results. If you do not hear from us within 7 days, please contact the clinic through MyChart or phone. If you have a critical or abnormal lab result, we will notify you by phone as soon as possible.  Submit refill requests through [employer] or call your pharmacy and they will forward the refill request to us. Please allow 3 business days for your refill to be completed.          Additional Information About Your Visit        MyChart Information     [employer] lets you send messages to your doctor, view your test results, renew your prescriptions, schedule appointments and more. To sign up, go to [email or web address] . Click on \"Log in\" on the left side of the screen, which will take you to the Welcome page. Then click on \"Sign up Now\" on the right side of the page.     You will be asked to enter the access code listed below, as well as some personal information. Please follow the directions to create your username and password.     Your access code is: [email or web address]  Expires: 2019  1:44 PM     Your access code will  in 90 days. If you " need help or a new code, please call your [facility] clinic or [phone].        Care EveryWhere ID     This is your Care EveryWhere ID. This could be used by other organizations to access your [place] medical records  [id number]        Your Vitals Were     Pulse Last Period BMI (Body Mass Index)             86 05/23/2018 (Exact Date) 38.17 kg/m2          Blood Pressure from Last 3 Encounters:   11/30/18 125/81   11/02/18 130/85   10/05/18 135/85    Weight from Last 3 Encounters:   11/30/18 211 lb 6.4 oz (95.9 kg)   11/02/18 211 lb 14.4 oz (96.1 kg)   10/05/18 211 lb 3.2 oz (95.8 kg)              We Performed the Following     CBC with platelets     Ferritin        Primary Care Provider Fax #    Physician No Ref-Primary [phone]       No address on file        Equal Access to Services     [facility]: Hadii travis Guzmán, wakailash frances, deepa kaalmagabriele lazaro, lindsay pink . So [facility] [phone].    ATENCIÓN: Si habla español, tiene a lopez disposición servicios gratuitos de asistencia lingüística. Jenniferame al [phone].    We comply with applicable federal civil rights laws and [place] laws. We do not discriminate on the basis of race, color, national origin, age, disability, sex, sexual orientation, or gender identity.            Thank you!     Thank you for choosing [facility]  for your care. Our goal is always to provide you with excellent care. Hearing back from our patients is one way we can continue to improve our services. Please take a few minutes to complete the written survey that you may receive in the mail after your visit with us. Thank you!             Your Updated Medication List - Protect others around you: Learn how to safely use, store and throw away your medicines at www.disposemymeds.org.          This list is accurate as of 11/30/18  2:27 PM.  Always use your most recent med list.                   Brand Name Dispense  Instructions for use Diagnosis    ferrous sulfate 325 (65 Fe) MG tablet    FEROSUL    90 tablet    Take 1 tablet (325 mg) by mouth daily (with breakfast)    History of anemia       fluticasone 50 MCG/ACT nasal spray    FLONASE    1 Bottle    Spray 1-2 sprays into both nostrils daily    Acute allergic rhinitis due to pollen, unspecified seasonality       other medical supplies     2 Units    Compression stockings    Swelling of lower extremity during pregnancy in second trimester

## 2018-12-03 DIAGNOSIS — R79.0 LOW SERUM FERRITIN LEVEL: ICD-10-CM

## 2018-12-03 DIAGNOSIS — O99.013 ANEMIA AFFECTING PREGNANCY IN THIRD TRIMESTER: ICD-10-CM

## 2018-12-03 RX ORDER — DIPHENHYDRAMINE HCL 25 MG
25 CAPSULE ORAL
Status: CANCELLED | OUTPATIENT
Start: 2018-12-03

## 2018-12-03 RX ORDER — ACETAMINOPHEN 325 MG/1
650 TABLET ORAL
Status: CANCELLED
Start: 2018-12-03

## 2018-12-14 ENCOUNTER — PRENATAL OFFICE VISIT (OUTPATIENT)
Dept: OBGYN | Facility: CLINIC | Age: 43
End: 2018-12-14
Payer: COMMERCIAL

## 2018-12-14 VITALS
BODY MASS INDEX: 37.16 KG/M2 | SYSTOLIC BLOOD PRESSURE: 124 MMHG | HEART RATE: 93 BPM | DIASTOLIC BLOOD PRESSURE: 80 MMHG | WEIGHT: 205.8 LBS | OXYGEN SATURATION: 98 %

## 2018-12-14 DIAGNOSIS — Z30.2 ENCOUNTER FOR STERILIZATION: ICD-10-CM

## 2018-12-14 DIAGNOSIS — Z23 NEED FOR TDAP VACCINATION: Primary | ICD-10-CM

## 2018-12-14 DIAGNOSIS — Z23 NEED FOR PROPHYLACTIC VACCINATION AND INOCULATION AGAINST INFLUENZA: ICD-10-CM

## 2018-12-14 PROCEDURE — 90471 IMMUNIZATION ADMIN: CPT | Performed by: OBSTETRICS & GYNECOLOGY

## 2018-12-14 PROCEDURE — 90686 IIV4 VACC NO PRSV 0.5 ML IM: CPT | Performed by: OBSTETRICS & GYNECOLOGY

## 2018-12-14 PROCEDURE — 90472 IMMUNIZATION ADMIN EACH ADD: CPT | Performed by: OBSTETRICS & GYNECOLOGY

## 2018-12-14 PROCEDURE — 99207 ZZC PRENATAL VISIT: CPT | Performed by: OBSTETRICS & GYNECOLOGY

## 2018-12-14 PROCEDURE — 90715 TDAP VACCINE 7 YRS/> IM: CPT | Performed by: OBSTETRICS & GYNECOLOGY

## 2018-12-14 NOTE — PROGRESS NOTES
Injectable Influenza Immunization Documentation    1.  Is the person to be vaccinated sick today?   No    2. Does the person to be vaccinated have an allergy to a component   of the vaccine?   No  Egg Allergy Algorithm Link    3. Has the person to be vaccinated ever had a serious reaction   to influenza vaccine in the past?   No    4. Has the person to be vaccinated ever had Guillain-Barré syndrome?   No    Form completed by Kylie Giron CMA  December 14, 2018 3:46 PM          TDAP Vaccine (Adacel)     Date Status Dose VIS Date Route Site  Lot# Given By Verified By   12/14/2018 Given 0.5 mL 02/24/2015, Given Today IM LD San[employer] [id number] Kylie Giron CMA --   Exp. Date NDC # Product Time Location External Comment   10/10/2020 25591-891-13 ADACEL --  --    Updated by: Kylie Giron CMA on 12/14/2018  3:47 PM

## 2018-12-14 NOTE — PATIENT INSTRUCTIONS
If you have any questions regarding your visit, Please contact your care team.    [facility]: [phone]      [facility] CLINIC HOURS TELEPHONE NUMBER   Minnie Krishnan DO.    JOSIE Espinal -    CORINE Valderrama       Monday, Wednesday, Thursday and Friday, [place]  8:30a.m-5:00 p.m   [facility]  [address] [place] [address]  [phone] ask for [facility]    Imaging [phone]       Urgent Care locations:    [facility] Saturday and Sunday   9 am - 5 pm    Monday-Friday   12 pm - 8 pm  Saturday and Sunday   9 am - 5 pm   [phone]     [facility] Labor and Delivery:  [phone]    If you need a medication refill, please contact your pharmacy. Please allow 3 business days for your refill to be completed.  As always, Thank you for trusting us with your healthcare needs!         [facility]    PATIENT'S NAME: Modesto Darling   ATTENDING PHYSICIAN: Dennis Nguyen MD   OPERATING PHYSICIAN: Dennis Nguyen MD   PATIENT ACCOUNT#:   [de-identified]    LOCATION:  [facility] [address] PACU [address] [place] [address]  MEDICAL RECORD #:   [id number]       DATE OF BIRTH:  2 preretinal surface and used to displace the subretinal fluid into the vitreous cavity. The perfluorocarbon was injected up to the anterior lip of the retinal break.   A fluid-air exchange was then carried out with drainage of any subretinal fluid through t

## 2018-12-15 PROBLEM — Z30.2 ENCOUNTER FOR STERILIZATION: Status: ACTIVE | Noted: 2018-12-15

## 2018-12-16 NOTE — PROGRESS NOTES
She reports feeling reassuring daily fetal activity and will continue to record.  She lost 6 lbs since her last visit but denies dieting.  She has a f/u appt with MFM and is also scheduled for an iron infusion due to a ferritin level of 2.  She requests flu and Tdap vaccines so these were provided today.  She is not interested in future childbearing and would like a PPTL.  Therefore, the [place] consent form for sterilization was signed after informed consent was reviewed and obtained.  A Kiswahili interpretor was present throughout today's visit.

## 2018-12-17 ENCOUNTER — INFUSION THERAPY VISIT (OUTPATIENT)
Dept: INFUSION THERAPY | Facility: CLINIC | Age: 43
End: 2018-12-17
Payer: COMMERCIAL

## 2018-12-17 VITALS
WEIGHT: 209.9 LBS | BODY MASS INDEX: 37.9 KG/M2 | DIASTOLIC BLOOD PRESSURE: 82 MMHG | RESPIRATION RATE: 16 BRPM | SYSTOLIC BLOOD PRESSURE: 123 MMHG | OXYGEN SATURATION: 98 % | TEMPERATURE: 98.7 F | HEART RATE: 84 BPM

## 2018-12-17 DIAGNOSIS — O99.013 ANEMIA AFFECTING PREGNANCY IN THIRD TRIMESTER: ICD-10-CM

## 2018-12-17 DIAGNOSIS — R79.0 LOW SERUM FERRITIN LEVEL: Primary | ICD-10-CM

## 2018-12-17 PROCEDURE — 96366 THER/PROPH/DIAG IV INF ADDON: CPT | Performed by: INTERNAL MEDICINE

## 2018-12-17 PROCEDURE — 99207 ZZC NO CHARGE LOS: CPT

## 2018-12-17 PROCEDURE — 96365 THER/PROPH/DIAG IV INF INIT: CPT | Performed by: INTERNAL MEDICINE

## 2018-12-17 RX ORDER — ACETAMINOPHEN 325 MG/1
650 TABLET ORAL
Status: DISCONTINUED | OUTPATIENT
Start: 2018-12-17 | End: 2018-12-17 | Stop reason: HOSPADM

## 2018-12-17 RX ORDER — DIPHENHYDRAMINE HCL 25 MG
25 CAPSULE ORAL
Status: DISCONTINUED | OUTPATIENT
Start: 2018-12-17 | End: 2018-12-17 | Stop reason: CLARIF

## 2018-12-17 RX ORDER — ACETAMINOPHEN 325 MG/1
650 TABLET ORAL
Status: CANCELLED
Start: 2018-12-17

## 2018-12-17 RX ORDER — DIPHENHYDRAMINE HCL 25 MG
25 CAPSULE ORAL
Status: CANCELLED | OUTPATIENT
Start: 2018-12-17

## 2018-12-17 RX ADMIN — Medication 250 ML: at 15:06

## 2018-12-17 ASSESSMENT — PAIN SCALES - GENERAL: PAINLEVEL: NO PAIN (0)

## 2018-12-17 NOTE — PROGRESS NOTES
Infusion Nursing Note:  Charity PALMER presents today for 1st Venofer infusion.    Patient seen by provider today: No   present during visit today: Yes Nepali    Note: Orientated patient to the department, including how to use call light.  Informed patient of s/s of reaction and when to call for nurse..    Intravenous Access:  Peripheral IV placed.    Treatment Conditions:  Not Applicable.      Post Infusion Assessment:  Patient tolerated infusion without incident.  Blood return noted pre and post infusion.  Site patent and intact, free from redness, edema or discomfort.  No evidence of extravasations.  Access discontinued per protocol.    Discharge Plan:   Patient will return 12/19/18 for next appointment.   Patient discharged in stable condition accompanied by: self.  Departure Mode: Ambulatory.    Cyndie Villarreal RN

## 2018-12-19 ENCOUNTER — INFUSION THERAPY VISIT (OUTPATIENT)
Dept: INFUSION THERAPY | Facility: CLINIC | Age: 43
End: 2018-12-19
Payer: COMMERCIAL

## 2018-12-19 VITALS
DIASTOLIC BLOOD PRESSURE: 78 MMHG | SYSTOLIC BLOOD PRESSURE: 121 MMHG | TEMPERATURE: 98.7 F | RESPIRATION RATE: 18 BRPM | HEART RATE: 85 BPM | OXYGEN SATURATION: 99 %

## 2018-12-19 DIAGNOSIS — R79.0 LOW SERUM FERRITIN LEVEL: Primary | ICD-10-CM

## 2018-12-19 DIAGNOSIS — O99.013 ANEMIA AFFECTING PREGNANCY IN THIRD TRIMESTER: ICD-10-CM

## 2018-12-19 PROCEDURE — 96365 THER/PROPH/DIAG IV INF INIT: CPT | Performed by: INTERNAL MEDICINE

## 2018-12-19 PROCEDURE — 99207 ZZC NO CHARGE LOS: CPT

## 2018-12-19 RX ORDER — DIPHENHYDRAMINE HCL 25 MG
25 CAPSULE ORAL
Status: CANCELLED | OUTPATIENT
Start: 2018-12-19

## 2018-12-19 RX ORDER — ACETAMINOPHEN 325 MG/1
650 TABLET ORAL
Status: CANCELLED
Start: 2018-12-19

## 2018-12-19 RX ADMIN — Medication 250 ML: at 15:11

## 2018-12-19 ASSESSMENT — PAIN SCALES - GENERAL: PAINLEVEL: NO PAIN (0)

## 2018-12-19 NOTE — PROGRESS NOTES
Infusion Nursing Note:  Charity PALMER presents today for Venofer.    Patient seen by provider today: No   present during visit today: yes, Danish  Note: reports some fatigue after last infusion, but otherwise no side effects.    Intravenous Access:  Peripheral IV placed.    Treatment Conditions:  Not Applicable.    Post Infusion Assessment:  Patient tolerated infusion without incident.    Discharge Plan:   RTC as scheduled for dose#3.    RICKIE NUNEZ RN

## 2018-12-21 ENCOUNTER — INFUSION THERAPY VISIT (OUTPATIENT)
Dept: INFUSION THERAPY | Facility: CLINIC | Age: 43
End: 2018-12-21
Payer: COMMERCIAL

## 2018-12-21 VITALS
DIASTOLIC BLOOD PRESSURE: 85 MMHG | OXYGEN SATURATION: 98 % | SYSTOLIC BLOOD PRESSURE: 127 MMHG | RESPIRATION RATE: 16 BRPM | TEMPERATURE: 98.5 F | BODY MASS INDEX: 37.92 KG/M2 | WEIGHT: 210.03 LBS | HEART RATE: 93 BPM

## 2018-12-21 DIAGNOSIS — O99.013 ANEMIA AFFECTING PREGNANCY IN THIRD TRIMESTER: ICD-10-CM

## 2018-12-21 DIAGNOSIS — R79.0 LOW SERUM FERRITIN LEVEL: Primary | ICD-10-CM

## 2018-12-21 PROCEDURE — 96365 THER/PROPH/DIAG IV INF INIT: CPT | Performed by: INTERNAL MEDICINE

## 2018-12-21 PROCEDURE — 99207 ZZC NO CHARGE LOS: CPT

## 2018-12-21 RX ORDER — ACETAMINOPHEN 325 MG/1
650 TABLET ORAL
Status: CANCELLED
Start: 2018-12-21

## 2018-12-21 RX ORDER — DIPHENHYDRAMINE HCL 25 MG
25 CAPSULE ORAL
Status: CANCELLED | OUTPATIENT
Start: 2018-12-21

## 2018-12-21 RX ADMIN — Medication 250 ML: at 14:50

## 2018-12-21 ASSESSMENT — PAIN SCALES - GENERAL: PAINLEVEL: NO PAIN (0)

## 2018-12-21 NOTE — PROGRESS NOTES
Infusion Nursing Note:  Charity PALMER presents today for dose 3/3 of Venofer.    Patient seen by provider today: No   present during visit today: Yes: Croatian    Note: N/A.    Intravenous Access:  Peripheral IV placed.    Treatment Conditions:  Not Applicable.      Post Infusion Assessment:  Patient tolerated infusion without incident.  Blood return noted pre and post infusion.  Site patent and intact, free from redness, edema or discomfort.  No evidence of extravasations.  Access discontinued per protocol.    Discharge Plan:   Patient does not need to return for future infusion.  Patient will follow up with ordering providers.  Patient discharged in stable condition accompanied by: self.  Departure Mode: Ambulatory.    Cyndie Villarreal RN

## 2018-12-25 NOTE — PROGRESS NOTES
Presents for routine  appointment.    Left leg swelling  No LOF/VB/Ctxs.    ROS:   and GI  negative.     Please see Prenatal Vitals and Notes Flowsheet for objective data.  Lower extremity swelling bilaterally, left not significantly more.  Normal and equal pulses.  Negative Gerri      A/P:  43 year old  at 33w6d       ICD-10-CM    1. Anemia affecting pregnancy in third trimester O99.013 Hemoglobin     Ferritin   2. Elderly multigravida in third trimester O09.523    3. Encounter for sterilization Z30.2    4. Asymptomatic varicose veins of both lower extremities I83.93 order for DME       She is status post iron infusion -.  She does not tolerate oral iron. Labs today  She is still interested in PPTL. Consent signed at prior visit.   Follow up in 2 weeks.      Kylie Stafford MD

## 2018-12-28 ENCOUNTER — PRENATAL OFFICE VISIT (OUTPATIENT)
Dept: OBGYN | Facility: CLINIC | Age: 43
End: 2018-12-28
Payer: COMMERCIAL

## 2018-12-28 ENCOUNTER — TELEPHONE (OUTPATIENT)
Dept: MATERNAL FETAL MEDICINE | Facility: CLINIC | Age: 43
End: 2018-12-28

## 2018-12-28 VITALS
DIASTOLIC BLOOD PRESSURE: 80 MMHG | HEART RATE: 87 BPM | BODY MASS INDEX: 38.35 KG/M2 | SYSTOLIC BLOOD PRESSURE: 129 MMHG | WEIGHT: 212.4 LBS

## 2018-12-28 DIAGNOSIS — O09.523 ELDERLY MULTIGRAVIDA IN THIRD TRIMESTER: ICD-10-CM

## 2018-12-28 DIAGNOSIS — O99.013 ANEMIA AFFECTING PREGNANCY IN THIRD TRIMESTER: Primary | ICD-10-CM

## 2018-12-28 DIAGNOSIS — I83.93 ASYMPTOMATIC VARICOSE VEINS OF BOTH LOWER EXTREMITIES: ICD-10-CM

## 2018-12-28 DIAGNOSIS — Z30.2 ENCOUNTER FOR STERILIZATION: ICD-10-CM

## 2018-12-28 LAB
FERRITIN SERPL-MCNC: 98 NG/ML (ref 12–150)
HGB BLD-MCNC: 9.8 G/DL (ref 11.7–15.7)

## 2018-12-28 PROCEDURE — 36415 COLL VENOUS BLD VENIPUNCTURE: CPT | Performed by: OBSTETRICS & GYNECOLOGY

## 2018-12-28 PROCEDURE — 82728 ASSAY OF FERRITIN: CPT | Performed by: OBSTETRICS & GYNECOLOGY

## 2018-12-28 PROCEDURE — 99207 ZZC PRENATAL VISIT: CPT | Performed by: OBSTETRICS & GYNECOLOGY

## 2018-12-28 PROCEDURE — 85018 HEMOGLOBIN: CPT | Performed by: OBSTETRICS & GYNECOLOGY

## 2018-12-28 PROCEDURE — 59426 ANTEPARTUM CARE ONLY: CPT | Performed by: OBSTETRICS & GYNECOLOGY

## 2018-12-28 NOTE — NURSING NOTE
"Chief Complaint   Patient presents with     Prenatal Care       Initial /80 (BP Location: Right arm, Patient Position: Chair, Cuff Size: Adult Regular)   Pulse 87   Wt 96.3 kg (212 lb 6.4 oz)   LMP 2018 (Exact Date)   BMI 38.35 kg/m   Estimated body mass index is 37.92 kg/m  as calculated from the following:    Height as of 8/3/18: 1.585 m (5' 2.4\").    Weight as of 18: 95.3 kg (210 lb 0.5 oz).  BP completed using cuff size: regular        Trish Lopez, [facility]  2018          "

## 2018-12-28 NOTE — TELEPHONE ENCOUNTER
M received referral from NM for fetal echocardiogram.  Patient was initially scheduled for this exam on 18, then rescheduled for  in which patient no showed for.  Calls made to patient to get this exam rescheduled, messages were left, and no return call from patient. Spoke with pediatric cardiologist, Dr. Hayden, on 18 who suggested that it would be more reasonable for patient to have a post  echo.  Referring clinic notified.  Spoke w/ [facility] nurse, Kylie, on 18 at 11:00am who agreed to call patient's referring MD and the patient to notify them of plan.    Qiana Mitchell

## 2019-01-09 ENCOUNTER — PRENATAL OFFICE VISIT (OUTPATIENT)
Dept: OBGYN | Facility: CLINIC | Age: 44
End: 2019-01-09
Payer: COMMERCIAL

## 2019-01-09 VITALS
BODY MASS INDEX: 37.59 KG/M2 | SYSTOLIC BLOOD PRESSURE: 136 MMHG | DIASTOLIC BLOOD PRESSURE: 88 MMHG | HEART RATE: 87 BPM | OXYGEN SATURATION: 99 % | WEIGHT: 208.2 LBS

## 2019-01-09 DIAGNOSIS — Z36.89 DETERMINE FETAL PRESENTATION USING ULTRASOUND: ICD-10-CM

## 2019-01-09 DIAGNOSIS — O09.523 ELDERLY MULTIGRAVIDA IN THIRD TRIMESTER: Primary | ICD-10-CM

## 2019-01-09 PROCEDURE — 99207 ZZC PRENATAL VISIT: CPT | Performed by: OBSTETRICS & GYNECOLOGY

## 2019-01-09 PROCEDURE — 87653 STREP B DNA AMP PROBE: CPT | Performed by: OBSTETRICS & GYNECOLOGY

## 2019-01-09 NOTE — PATIENT INSTRUCTIONS
If you have any questions regarding your visit, Please contact your care team.    [facility]: [phone]      [facility] CLINIC HOURS TELEPHONE NUMBER   Minnie Krishnan DO.    JOSIE Espinal -    CORINE Valderrama       Monday, Wednesday, Thursday and Friday, [place]  8:30a.m-5:00 p.m   [facility]  [address] [place] [address]  [phone] ask for [facility]    Imaging [phone]       Urgent Care locations:    [facility] Saturday and Sunday   9 am - 5 pm    Monday-Friday   12 pm - 8 pm  Saturday and Sunday   9 am - 5 pm   [phone]     [facility] Labor and Delivery:  [phone]    If you need a medication refill, please contact your pharmacy. Please allow 3 business days for your refill to be completed.  As always, Thank you for trusting us with your healthcare needs!

## 2019-01-09 NOTE — PROGRESS NOTES
She reports feeling reassuring daily fetal activity and will continue to record.  She lost 4 lbs since her last visit but denies dieting.  She also denies any fluid leakage or regular uterine contractions.  A bedside OB US was performed and verified a vertex presentation with normal fetal movement and adequate AFV.  The GBS cultures were obtained and submitted.  She denies any allergies.  Her cervix remains unfavorable.  She already has received her flu and Tdap vaccines.

## 2019-01-10 LAB
GP B STREP DNA SPEC QL NAA+PROBE: NEGATIVE
SPECIMEN SOURCE: NORMAL

## 2019-01-18 ENCOUNTER — PRENATAL OFFICE VISIT (OUTPATIENT)
Dept: OBGYN | Facility: CLINIC | Age: 44
End: 2019-01-18
Payer: COMMERCIAL

## 2019-01-18 VITALS
BODY MASS INDEX: 37.83 KG/M2 | TEMPERATURE: 98.4 F | SYSTOLIC BLOOD PRESSURE: 137 MMHG | WEIGHT: 209.5 LBS | HEART RATE: 79 BPM | DIASTOLIC BLOOD PRESSURE: 85 MMHG

## 2019-01-18 DIAGNOSIS — Z34.83 ENCOUNTER FOR SUPERVISION OF OTHER NORMAL PREGNANCY, THIRD TRIMESTER: Primary | ICD-10-CM

## 2019-01-18 PROCEDURE — 99207 ZZC PRENATAL VISIT: CPT | Performed by: OBSTETRICS & GYNECOLOGY

## 2019-01-18 NOTE — PATIENT INSTRUCTIONS
If you have any questions regarding your visit, Please contact your care team.    Women s Health CLINIC HOURS TELEPHONE NUMBER   Minnie Krishnan DO.    JOSIE Espinal -    CORINE Valderrama       Monday, Wednesday, Thursday and Friday, [place]  8:30a.m-5:00 p.m   [facility]  [address] [place] [address]  [phone] ask for [facility]    Imaging [phone]       Urgent Care locations:    [facility] Saturday and Sunday   9 am - 5 pm    Monday-Friday   12 pm - 8 pm  Saturday and Sunday   9 am - 5 pm   [phone]     [facility] Labor and Delivery:  [phone]    If you need a medication refill, please contact your pharmacy. Please allow 3 business days for your refill to be completed.  As always, Thank you for trusting us with your healthcare needs!

## 2019-01-18 NOTE — PROGRESS NOTES
She reports feeling reassuring daily fetal activity and will continue to record.  She gained 1 lb since her last visit and denies any fluid leakage or regular uterine contractions.  Her GBS status is negative and her cervix remains unchanged and unfavorable.

## 2019-01-23 ENCOUNTER — PRENATAL OFFICE VISIT (OUTPATIENT)
Dept: OBGYN | Facility: CLINIC | Age: 44
End: 2019-01-23
Payer: COMMERCIAL

## 2019-01-23 VITALS
WEIGHT: 208.5 LBS | SYSTOLIC BLOOD PRESSURE: 136 MMHG | BODY MASS INDEX: 37.65 KG/M2 | HEART RATE: 78 BPM | OXYGEN SATURATION: 98 % | DIASTOLIC BLOOD PRESSURE: 89 MMHG

## 2019-01-23 DIAGNOSIS — O09.529 SUPERVISION OF HIGH-RISK PREGNANCY OF ELDERLY MULTIGRAVIDA: Primary | ICD-10-CM

## 2019-01-23 PROCEDURE — 99207 ZZC COMPLICATED OB VISIT: CPT | Performed by: OBSTETRICS & GYNECOLOGY

## 2019-01-23 NOTE — PROGRESS NOTES
She reports feeling reassuring daily fetal activity and will continue to record.  She lost 1 lb since her last visit but denies dieting.  She denies any fluid leakage or regular uterine contractions.  Her initial BP was elevated at 154/97 as was her repeat BP of 145/96.  She feels that this is because she received a message on her phone while waiting in the lobby, that her mother missed her plane flight to [facility] so she became upset.  However, I advised that she go directly to L&D at [facility] for labwork and monitoring.  I called and gave report to Dr. Agbeh and sonal Oneill RN.  The patient will have her   her 2 daughters from the hospital since they need to register for high school in 30 minutes.  The Micronesian interpretor was present throughout today's visit.

## 2019-01-23 NOTE — PATIENT INSTRUCTIONS
If you have any questions regarding your visit, Please contact your care team.    [facility]: [phone]      [facility] CLINIC HOURS TELEPHONE NUMBER   Minnie Krishnan DO.    JOSIE Espinal -    CORINE Valderrama       Monday, Wednesday, Thursday and Friday, [place]  8:30a.m-5:00 p.m   [facility]  [address] [place] [address]  [phone] ask for [facility]    Imaging [phone]       Urgent Care locations:    [facility] Saturday and Sunday   9 am - 5 pm    Monday-Friday   12 pm - 8 pm  Saturday and Sunday   9 am - 5 pm   [phone]     [facility] Labor and Delivery:  [phone]    If you need a medication refill, please contact your pharmacy. Please allow 3 business days for your refill to be completed.  As always, Thank you for trusting us with your healthcare needs!

## 2019-02-01 ENCOUNTER — PRENATAL OFFICE VISIT (OUTPATIENT)
Dept: OBGYN | Facility: CLINIC | Age: 44
End: 2019-02-01
Payer: COMMERCIAL

## 2019-02-01 VITALS
BODY MASS INDEX: 38.51 KG/M2 | SYSTOLIC BLOOD PRESSURE: 133 MMHG | DIASTOLIC BLOOD PRESSURE: 86 MMHG | WEIGHT: 213.3 LBS | OXYGEN SATURATION: 99 % | HEART RATE: 79 BPM

## 2019-02-01 DIAGNOSIS — Z34.83 ENCOUNTER FOR SUPERVISION OF OTHER NORMAL PREGNANCY, THIRD TRIMESTER: Primary | ICD-10-CM

## 2019-02-01 PROCEDURE — 99207 ZZC PRENATAL VISIT: CPT | Performed by: OBSTETRICS & GYNECOLOGY

## 2019-02-01 NOTE — PROGRESS NOTES
She reports feeling reassuring daily fetal activity and will continue to record.  She gained 5 lbs since her last visit and denies any fluid leakage or regular uterine contractions.  Her GBS status is negative and her cervix remains unchanged and unfavorable - see above.  Her Uruguayan interpretor was present throughout today's visit.

## 2019-02-01 NOTE — PATIENT INSTRUCTIONS
If you have any questions regarding your visit, Please contact your care team.    [facility]: [phone]      [facility] CLINIC HOURS TELEPHONE NUMBER   Minnie Krishnan DO.    JOSIE Espinal -    CORINE Valderrama       Monday, Wednesday, Thursday and Friday, [place]  8:30a.m-5:00 p.m   [facility]  [address] [place] [address]  [phone] ask for [facility]    Imaging [phone]       Urgent Care locations:    [facility] Saturday and Sunday   9 am - 5 pm    Monday-Friday   12 pm - 8 pm  Saturday and Sunday   9 am - 5 pm   [phone]     [facility] Labor and Delivery:  [phone]    If you need a medication refill, please contact your pharmacy. Please allow 3 business days for your refill to be completed.  As always, Thank you for trusting us with your healthcare needs!

## 2019-02-06 ENCOUNTER — PRENATAL OFFICE VISIT (OUTPATIENT)
Dept: OBGYN | Facility: CLINIC | Age: 44
End: 2019-02-06
Payer: COMMERCIAL

## 2019-02-06 VITALS
HEART RATE: 81 BPM | WEIGHT: 212.5 LBS | BODY MASS INDEX: 38.37 KG/M2 | DIASTOLIC BLOOD PRESSURE: 84 MMHG | SYSTOLIC BLOOD PRESSURE: 134 MMHG | OXYGEN SATURATION: 98 %

## 2019-02-06 DIAGNOSIS — O48.1 PROLONGED PREGNANCY, ANTEPARTUM: Primary | ICD-10-CM

## 2019-02-06 PROCEDURE — 99207 ZZC PRENATAL VISIT: CPT | Performed by: OBSTETRICS & GYNECOLOGY

## 2019-02-06 PROCEDURE — 59425 ANTEPARTUM CARE ONLY: CPT | Performed by: OBSTETRICS & GYNECOLOGY

## 2019-02-06 NOTE — PATIENT INSTRUCTIONS
If you have any questions regarding your visit, Please contact your care team.    [facility]: [phone]      [facility] CLINIC HOURS TELEPHONE NUMBER   Minnie Krishnan DO.    JOSIE Espinal -    CORINE Valderrama       Monday, Wednesday, Thursday and Friday, [place]  8:30a.m-5:00 p.m   [facility]  [address] [place] [address]  [phone] ask for [facility]    Imaging [phone]       Urgent Care locations:    [facility] Saturday and Sunday   9 am - 5 pm    Monday-Friday   12 pm - 8 pm  Saturday and Sunday   9 am - 5 pm   [phone]     [facility] Labor and Delivery:  [phone]    If you need a medication refill, please contact your pharmacy. Please allow 3 business days for your refill to be completed.  As always, Thank you for trusting us with your healthcare needs!

## 2019-02-06 NOTE — PROGRESS NOTES
She reports feeling reassuring daily fetal activity and will continue to record.  She is hoping to avoid an induction of labor but will need a BPP with NST next Monday if she remains undelivered.  She lost 1 lb since her last visit but feels less edematous.  Her cervix remains unchanged and unfavorable.  Her Romanian interpretor was present throughout today's visit.

## 2019-02-13 ENCOUNTER — TELEPHONE (OUTPATIENT)
Dept: OBGYN | Facility: CLINIC | Age: 44
End: 2019-02-13

## 2019-02-13 NOTE — TELEPHONE ENCOUNTER
Patient is calling to inform us that [employer] will be contacting us for information regarding her delivery.  Patient is call to give us verbal consent to talk to [facility].  I informed patient that I will make note of that and wait for [employer] to contact us.  Kylie Giron, [facility]  February 13, 2019 2:02 PM

## 2019-02-15 NOTE — TELEPHONE ENCOUNTER
Received call from [employer]. Confirmed DOD and type of delivery.       Elaine Person CMA  February 15, 2019  2:43 PM

## 2019-09-24 ENCOUNTER — OFFICE VISIT (OUTPATIENT)
Dept: OBGYN | Facility: CLINIC | Age: 44
End: 2019-09-24
Payer: COMMERCIAL

## 2019-09-24 VITALS
WEIGHT: 224 LBS | DIASTOLIC BLOOD PRESSURE: 89 MMHG | OXYGEN SATURATION: 97 % | HEIGHT: 63 IN | HEART RATE: 77 BPM | BODY MASS INDEX: 39.69 KG/M2 | SYSTOLIC BLOOD PRESSURE: 134 MMHG

## 2019-09-24 DIAGNOSIS — Z01.411 ENCOUNTER FOR GYNECOLOGICAL EXAMINATION WITH ABNORMAL FINDING: Primary | ICD-10-CM

## 2019-09-24 PROCEDURE — 99396 PREV VISIT EST AGE 40-64: CPT | Performed by: OBSTETRICS & GYNECOLOGY

## 2019-09-24 PROCEDURE — 96372 THER/PROPH/DIAG INJ SC/IM: CPT | Performed by: OBSTETRICS & GYNECOLOGY

## 2019-09-24 RX ORDER — MEDROXYPROGESTERONE ACETATE 150 MG/ML
150 INJECTION, SUSPENSION INTRAMUSCULAR
Status: DISCONTINUED | OUTPATIENT
Start: 2019-09-24 | End: 2019-12-20

## 2019-09-24 RX ADMIN — MEDROXYPROGESTERONE ACETATE 150 MG: 150 INJECTION, SUSPENSION INTRAMUSCULAR at 16:41

## 2019-09-24 ASSESSMENT — PATIENT HEALTH QUESTIONNAIRE - PHQ9: SUM OF ALL RESPONSES TO PHQ QUESTIONS 1-9: 1

## 2019-09-24 ASSESSMENT — MIFFLIN-ST. JEOR: SCORE: 1641.31

## 2019-09-24 NOTE — PROGRESS NOTES
"Charity is a 44 year old  here for annual exam.  Here with  .   Still breast feeding. Wants Depo provera for contraception. She was to have PPTL but she and spouse changed their mind..      ROS: Ten point review of systems was reviewed and negative except the above.    Health Maintenance   Topic Date Due     PREVENTIVE CARE VISIT  1975     PHQ-2  2019     INFLUENZA VACCINE (1) 2019     HPV  2023     DTAP/TDAP/TD IMMUNIZATION (3 - Td) 2028     HIV SCREENING  Completed     PAP FOLLOW-UP  Completed     IPV IMMUNIZATION  Aged Out     MENINGITIS IMMUNIZATION  Aged Out      Last pap:   Last Mammogram: none  Last Dexa: none  Last Colonoscopy: none  No results found for: CHOL  No results found for: HDL  No results found for: LDL  No results found for: TRIG  No results found for: CHOLHDLRATIO      OBHX:    OB History    Para Term  AB Living   5 4 4 0 1 4   SAB TAB Ectopic Multiple Live Births   0 0 0 0 4      # Outcome Date GA Lbr Jamaal/2nd Weight Sex Delivery Anes PTL Lv   5 Term 02/10/19 40w1d   F  None  SARAH      Apgar1: 8  Apgar5: 9   4 AB 2016           3 Term 05   3.118 kg (6 lb 14 oz) F    SARAH   2 Term 04   3.487 kg (7 lb 11 oz) F    SARAH   1 Term 98   2.75 kg (6 lb 1 oz) F    SARAH       History reviewed. No pertinent surgical history.    PMH: Her past medical, surgical, and obstetric histories were reviewed and are documented in their appropriate chart areas.    ALL/Meds: Her medication and allergy histories were reviewed and are documented in their appropriate chart areas.    SH/FMH: Her social and family history was reviewed and documented in its appropriate chart area.    PE: /89   Pulse 77   Ht 1.594 m (5' 2.76\")   Wt 101.6 kg (224 lb)   SpO2 97%   BMI 39.99 kg/m    Body mass index is 39.99 kg/m .    General Appearance:  healthy, alert, active, no distress  Cardiovascular:  Regular rate and Rhythm  Neck: Supple, no " adenopathy and thyroid normal  Lungs:  Clear, without wheeze, rale or rhonchi  Breast: normal breast exam  Abdomen: Benign, Soft, flat, non-tender, No masses, organomegaly, No inguinal nodes and Bowel sounds normoactiveSoft, nontender.   Pelvic:       - Ext: Vulva and perineum are normal without lesion, mass or discharge        - Urethra: normal without discharge or scarring or hypermobility       - Urethral Meatus: normal appearance,        - Bladder: no tenderness, no masses       - Vagina: Normal mucosa, no discharge     rugated       - Cervix: multiparous       - Uterus:Normal shape, position and consistencyfirm, nontender, nongravid uterus without CMT       - Adnexa: Normal without masses or tenderness       - Rectal: deferred    A/P:  [facility],     ICD-10-CM    1. Encounter for gynecological examination with abnormal finding Z01.411 CBC with platelets     Comprehensive metabolic panel     Lipid panel reflex to direct LDL Fasting     TSH with free T4 reflex     MA Screen Bilateral w/Chepe     medroxyPROGESTERone (DEPO-PROVERA) injection 150 mg   2. Contraception Z78.9 medroxyPROGESTERone (DEPO-PROVERA) injection 150 mg       -  BC: depoprovera     - Encouraged self-breast exam   - Encouraged low fat diet, regular exercise, and adequate calcium intake.    CEPHAS AGBEH, MD.

## 2019-09-24 NOTE — PROGRESS NOTES
The following medication was given:     MEDICATION: Depo Provera 150mg  ROUTE: IM  SITE: [facility]  DOSE: 150 mg  LOT #: [id number]  :  Kori  EXPIRATION DATE:  06/2020  NDC#: 07930-804-83  Next due 12/10-12/24/2019  Charity Ayala [facility]

## 2019-09-24 NOTE — PATIENT INSTRUCTIONS
If you have any questions regarding your visit, Please contact your care team.    Women s Health CLINIC HOURS TELEPHONE NUMBER   Cephas Agbeh, M.D.    Dai Acosta -         Monday-[facility]  8:00 am - 5 pm  Tuesday- [facility]  8:00am- 5 pm  Wednesday- Off  Thursday- [facility]  8:00 am- 5 pm  Friday-[place]  8:00 am 5 pm [facility]  [address] [place] [address]  [phone] ask for [facility]    Imaging [phone]    [facility]  [address]  [place] [address] [phone]  Imaging [phone]     Urgent Care locations:    [facility] Saturday and Sunday   9 am - 5 pm    Monday-Friday   12 pm - 8 pm  Saturday and Sunday   9 am - 5 pm   [phone]       If you need a medication refill, please contact your pharmacy. Please allow 3 business days for your refill to be completed.  As always, Thank you for trusting us with your healthcare needs!

## 2019-09-30 DIAGNOSIS — Z01.411 ENCOUNTER FOR GYNECOLOGICAL EXAMINATION WITH ABNORMAL FINDING: ICD-10-CM

## 2019-09-30 LAB
ALBUMIN SERPL-MCNC: 3.8 G/DL (ref 3.4–5)
ALP SERPL-CCNC: 73 U/L (ref 40–150)
ALT SERPL W P-5'-P-CCNC: 26 U/L (ref 0–50)
ANION GAP SERPL CALCULATED.3IONS-SCNC: 7 MMOL/L (ref 3–14)
AST SERPL W P-5'-P-CCNC: 21 U/L (ref 0–45)
BILIRUB SERPL-MCNC: 0.5 MG/DL (ref 0.2–1.3)
BUN SERPL-MCNC: 13 MG/DL (ref 7–30)
CALCIUM SERPL-MCNC: 8.8 MG/DL (ref 8.5–10.1)
CHLORIDE SERPL-SCNC: 108 MMOL/L (ref 94–109)
CHOLEST SERPL-MCNC: 94 MG/DL
CO2 SERPL-SCNC: 25 MMOL/L (ref 20–32)
CREAT SERPL-MCNC: 0.5 MG/DL (ref 0.52–1.04)
ERYTHROCYTE [DISTWIDTH] IN BLOOD BY AUTOMATED COUNT: 13.8 % (ref 10–15)
GFR SERPL CREATININE-BSD FRML MDRD: >90 ML/MIN/{1.73_M2}
GLUCOSE SERPL-MCNC: 94 MG/DL (ref 70–99)
HCT VFR BLD AUTO: 39.9 % (ref 35–47)
HDLC SERPL-MCNC: 40 MG/DL
HGB BLD-MCNC: 12.8 G/DL (ref 11.7–15.7)
LDLC SERPL CALC-MCNC: 41 MG/DL
MCH RBC QN AUTO: 27.8 PG (ref 26.5–33)
MCHC RBC AUTO-ENTMCNC: 32.1 G/DL (ref 31.5–36.5)
MCV RBC AUTO: 87 FL (ref 78–100)
NONHDLC SERPL-MCNC: 54 MG/DL
PLATELET # BLD AUTO: 268 10E9/L (ref 150–450)
POTASSIUM SERPL-SCNC: 3.9 MMOL/L (ref 3.4–5.3)
PROT SERPL-MCNC: 7.8 G/DL (ref 6.8–8.8)
RBC # BLD AUTO: 4.6 10E12/L (ref 3.8–5.2)
SODIUM SERPL-SCNC: 140 MMOL/L (ref 133–144)
TRIGL SERPL-MCNC: 65 MG/DL
TSH SERPL DL<=0.005 MIU/L-ACNC: 1.52 MU/L (ref 0.4–4)
WBC # BLD AUTO: 7.5 10E9/L (ref 4–11)

## 2019-09-30 PROCEDURE — 85027 COMPLETE CBC AUTOMATED: CPT | Performed by: OBSTETRICS & GYNECOLOGY

## 2019-09-30 PROCEDURE — 36415 COLL VENOUS BLD VENIPUNCTURE: CPT | Performed by: OBSTETRICS & GYNECOLOGY

## 2019-09-30 PROCEDURE — 80061 LIPID PANEL: CPT | Performed by: OBSTETRICS & GYNECOLOGY

## 2019-09-30 PROCEDURE — 80053 COMPREHEN METABOLIC PANEL: CPT | Performed by: OBSTETRICS & GYNECOLOGY

## 2019-09-30 PROCEDURE — 84443 ASSAY THYROID STIM HORMONE: CPT | Performed by: OBSTETRICS & GYNECOLOGY

## 2019-10-01 PROBLEM — Z78.9 USES BIRTH CONTROL: Status: ACTIVE | Noted: 2019-09-24

## 2019-12-18 PROBLEM — O99.013 ANEMIA AFFECTING PREGNANCY IN THIRD TRIMESTER: Status: RESOLVED | Noted: 2018-12-03 | Resolved: 2019-12-18

## 2019-12-18 PROBLEM — O35.DXX9: Status: RESOLVED | Noted: 2018-09-24 | Resolved: 2019-12-18

## 2019-12-18 PROBLEM — O09.529 AMA (ADVANCED MATERNAL AGE) MULTIGRAVIDA 35+: Status: RESOLVED | Noted: 2018-08-03 | Resolved: 2019-12-18

## 2019-12-18 PROBLEM — R79.0 LOW SERUM FERRITIN LEVEL: Status: RESOLVED | Noted: 2018-12-03 | Resolved: 2019-12-18

## 2019-12-18 PROBLEM — O28.0 ABNORMAL QUAD SCREEN: Status: RESOLVED | Noted: 2018-09-04 | Resolved: 2019-12-18

## 2019-12-18 NOTE — PROGRESS NOTES
OB/GYN      NAME:  Charity PALMER  PCP:  No Ref-Primary, Physician  MRN:  [id number]    Impression / Plan     44 year old  with:      ICD-10-CM    1. Encounter for IUD insertion Z30.430 INSERTION INTRAUTERINE DEVICE     HC LEVONORGESTREL IU 52MG 5 YR     levonorgestrel (MIRENA) 20 MCG/24HR IUD 20 mcg       Discussed contraceptive options.  Patient is breast feeding and has history of elevated BPs.  She would like to go ahead with Mirena.  See below.     present    Chief Complaint     Chief Complaint   Patient presents with     Abnormal Uterine Bleeding       HPI     Charity PALMER is a  44 year old female who is seen for abnormal uterine bleeding.    Patient saw Dr. Agbeh for her annual exam on 19.  She was breast feeding at the time and started on DMPA for contraception.  She and her spouse had been considering PPTL but changed their mind. Recent TSH was normal.  hgb 12.8.    She had irregular bleeding after the first month of the injection.  She is looking for something different.  She has had the ParaGard in the past. She would like the Mirena.      Patient's last menstrual period was 2019 (approximate).     Date of Last Pap Smear:   Lab Results   Component Value Date    PAP NIL 2018       Problem List     Patient Active Problem List    Diagnosis Date Noted     IUD (intrauterine device) in place 2019     Priority: Medium     Mirena placed 19 for contraception.  Replace/remove Dec 2024       Subclinical hyperthyroidism 2018     Priority: Medium     Monocular diplopia of left eye 2018     Priority: Medium     History of spontaneous  2018     Priority: Medium     Elevated blood pressure reading without diagnosis of hypertension 2018     Priority: Medium       Medications     No current outpatient medications on file.     Current Facility-Administered Medications   Medication     levonorgestrel (MIRENA) 20  MCG/24HR IUD 20 mcg        Allergies     No Known Allergies    Past Medical/Surgical History     Past Medical History:   Diagnosis Date     History of gestational diabetes        No past surgical history on file.     Social History     Social History     Socioeconomic History     Marital status:      Spouse name: Not on file     Number of children: Not on file     Years of education: Not on file     Highest education level: Not on file   Occupational History     Not on file   Social Needs     Financial resource strain: Not on file     Food insecurity:     Worry: Not on file     Inability: Not on file     Transportation needs:     Medical: Not on file     Non-medical: Not on file   Tobacco Use     Smoking status: Never Smoker     Smokeless tobacco: Never Used   Substance and Sexual Activity     Alcohol use: No     Drug use: No     Sexual activity: Yes     Partners: Male     Birth control/protection: Injection   Lifestyle     Physical activity:     Days per week: Not on file     Minutes per session: Not on file     Stress: Not on file   Relationships     Social connections:     Talks on phone: Not on file     Gets together: Not on file     Attends Yarsani service: Not on file     Active member of club or organization: Not on file     Attends meetings of clubs or organizations: Not on file     Relationship status: Not on file     Intimate partner violence:     Fear of current or ex partner: Not on file     Emotionally abused: Not on file     Physically abused: Not on file     Forced sexual activity: Not on file   Other Topics Concern     Parent/sibling w/ CABG, MI or angioplasty before 65F 55M? Not Asked   Social History Narrative     Not on file       Family History      Family History   Problem Relation Age of Onset     Hypertension Mother        ROS     A 6 organ review of systems was asked and the pertinent positives and negatives are listed in the HPI. All other organ systems can be considered negative.      Physical Exam   Vitals: BP (!) 151/89 (BP Location: Right arm, Cuff Size: Adult Large)   Pulse 74   Wt 101.8 kg (224 lb 8 oz)   LMP 11/02/2019 (Approximate)   BMI 40.08 kg/m      General: Comfortable, no obvious distress, obese  HEENT: Sclera clear.  Trachea midline.   Resp: breathing is non labored  Skin: No rashes, lesions, or subcutaneous nodules. Normal skin temperature.   Psych: Alert. Appropriate affect,.  Normal speech.   : Normal female external genitalia.  No lesions.  Urethral meatus normal.  Speculum exam reveals a normal vaginal vault, normal cervix.  No lesions.  No abnormal discharge.  Bimanual exam reveals a normal, mobile, nontender uterus.  No cervical motion tenderness.  Adnexa nontender with no palpable masses.   Extremities: No peripheral edema, nontender.         Labs/Imaging       Labs were reviewed in Epic         20 minutes was spent with patient, more than 50% counseling and coordinating care, as noted above in the A/P. Exclusive of IUD insertion    Nursing notes read and reviewed    Kylie Stafford MD     Charity PALMER is a 44 year old  Women who presents today requesting placement of an Mirena iud.  She denies the possibility of pregnancy. She is up to date with DMPA    We discussed risks, benefits, and alternatives including but not limited to:     Possibility of pregnancy and ectopic pregnancy.    Possibility of pelvic inflammatory disease, particularly in the first 20 days and with new partners.    Risk of uterine perforation or IUD expulsion.    Possibility of difficult removal.    Spotting or heavy bleeding.    Cramping, pain or infection during or after insertion.    The patient was given patient information on the IUD and the patient education brochure from the .  She understands the main indication for removal is abnormal bleeding.  The patient has given consent to proceed with placement of the IUD.  She wishes to proceed.  All questions  answered.    PROCEDURE:    Type of IUD: Mirena   She is placed in a dorsal lithotomy potion and a pelvic exam is performed to determine the position of the uterus.  The cervix is identified and cleaned with betadine.  An allis is applied to the anterior lip of the cervix for stabilization.  The uterus sounded to 8.0 cm. (Target sound depth is 6.5 cm to 8.5 cm.)  The IUD is inserted into the uterus under sterile conditions in the usual fashion.    Lot number [id number] and expiration date Mar 2022.  NDC#  95983-668.  The IUD string is then cut to 3.5 cm.    The patient tolerated this procedure without immediate complication.  The patient is to return or call immediately for any unexplained fever, abdominal or pelvic pain, excessive bleeding, possibility of pregnancy, foul-smelling discharge, sense that the IUD has been expelled.  All questions were answered.  Patient is aware that the IUD will be effective for 5 years and then will need removal or replacement.  Barrier methods for the first week advised.    Return to clinic in 1-3 months for IUD check    Kylie Stafford MD

## 2019-12-20 ENCOUNTER — OFFICE VISIT (OUTPATIENT)
Dept: OBGYN | Facility: CLINIC | Age: 44
End: 2019-12-20
Payer: COMMERCIAL

## 2019-12-20 VITALS
SYSTOLIC BLOOD PRESSURE: 151 MMHG | BODY MASS INDEX: 40.08 KG/M2 | WEIGHT: 224.5 LBS | HEART RATE: 74 BPM | DIASTOLIC BLOOD PRESSURE: 89 MMHG

## 2019-12-20 DIAGNOSIS — Z30.430 ENCOUNTER FOR IUD INSERTION: Primary | ICD-10-CM

## 2019-12-20 PROBLEM — Z30.2 ENCOUNTER FOR STERILIZATION: Status: RESOLVED | Noted: 2018-12-15 | Resolved: 2019-12-20

## 2019-12-20 PROBLEM — Z97.5 IUD (INTRAUTERINE DEVICE) IN PLACE: Status: ACTIVE | Noted: 2019-12-20

## 2019-12-20 PROCEDURE — 99213 OFFICE O/P EST LOW 20 MIN: CPT | Mod: 25 | Performed by: OBSTETRICS & GYNECOLOGY

## 2019-12-20 PROCEDURE — 58300 INSERT INTRAUTERINE DEVICE: CPT | Performed by: OBSTETRICS & GYNECOLOGY

## 2019-12-20 NOTE — PATIENT INSTRUCTIONS
You had a Mirena intrauterine device (IUD) placed today.  You should abstain from intercourse for one week after insertion.  You should use condoms to prevent sexually transmitted infection, especially in the first 20 days.  The IUD will be effective for 5 years and then will need removal or replacement.    Call if:     You have bad pain in your lower belly     You have excessive bleeding    You cannot feel the string of the IUD or if the string seems shorter than usual    You think your IUD might have moved or fallen out    You had sex with someone who has or might have an STD, or you think you have an STD    You have foul-smelling discharge    You have an unexplained fever    If you think you might be pregnant

## 2019-12-20 NOTE — NURSING NOTE
"Chief Complaint   Patient presents with     Abnormal Uterine Bleeding       Initial BP (!) 151/89 (BP Location: Right arm, Cuff Size: Adult Large)   Pulse 74   Wt 101.8 kg (224 lb 8 oz)   LMP 2019 (Approximate)   BMI 40.08 kg/m   Estimated body mass index is 40.08 kg/m  as calculated from the following:    Height as of 19: 1.594 m (5' 2.76\").    Weight as of this encounter: 101.8 kg (224 lb 8 oz).  BP completed using cuff size: regular        PHQ-9 score:    PHQ-9 SCORE 2019   PHQ-9 Total Score 1         Kylie Acevedo MA on 2019 at 8:04 AM    "

## 2020-02-23 ENCOUNTER — OFFICE VISIT (OUTPATIENT)
Dept: URGENT CARE | Facility: URGENT CARE | Age: 45
End: 2020-02-23
Payer: COMMERCIAL

## 2020-02-23 VITALS
OXYGEN SATURATION: 99 % | DIASTOLIC BLOOD PRESSURE: 104 MMHG | HEART RATE: 97 BPM | RESPIRATION RATE: 18 BRPM | SYSTOLIC BLOOD PRESSURE: 169 MMHG | BODY MASS INDEX: 39.1 KG/M2 | TEMPERATURE: 98.2 F | WEIGHT: 219 LBS

## 2020-02-23 DIAGNOSIS — J06.9 UPPER RESPIRATORY TRACT INFECTION, UNSPECIFIED TYPE: Primary | ICD-10-CM

## 2020-02-23 DIAGNOSIS — J02.9 SORE THROAT: ICD-10-CM

## 2020-02-23 LAB
DEPRECATED S PYO AG THROAT QL EIA: NEGATIVE
SPECIMEN SOURCE: NORMAL
SPECIMEN SOURCE: NORMAL
STREP GROUP A PCR: NOT DETECTED

## 2020-02-23 PROCEDURE — 40001204 ZZHCL STATISTIC STREP A RAPID: Performed by: PHYSICIAN ASSISTANT

## 2020-02-23 PROCEDURE — 87651 STREP A DNA AMP PROBE: CPT | Performed by: PHYSICIAN ASSISTANT

## 2020-02-23 PROCEDURE — 99213 OFFICE O/P EST LOW 20 MIN: CPT | Performed by: PHYSICIAN ASSISTANT

## 2020-02-23 RX ORDER — IBUPROFEN 800 MG/1
800 TABLET, FILM COATED ORAL EVERY 8 HOURS PRN
Qty: 30 TABLET | Refills: 0 | Status: SHIPPED | OUTPATIENT
Start: 2020-02-23 | End: 2020-09-30

## 2020-02-23 RX ORDER — BENZONATATE 200 MG/1
200 CAPSULE ORAL 3 TIMES DAILY PRN
Qty: 30 CAPSULE | Refills: 0 | Status: SHIPPED | OUTPATIENT
Start: 2020-02-23 | End: 2020-09-30

## 2020-02-23 ASSESSMENT — ENCOUNTER SYMPTOMS
EYES NEGATIVE: 1
COUGH: 1
CONSTITUTIONAL NEGATIVE: 1
GASTROINTESTINAL NEGATIVE: 1
CARDIOVASCULAR NEGATIVE: 1
MYALGIAS: 1
SORE THROAT: 1

## 2020-02-23 NOTE — PATIENT INSTRUCTIONS

## 2020-02-23 NOTE — PROGRESS NOTES
SUBJECTIVE:   Charity PALMER is a 44 year old female presenting with a chief complaint of   Chief Complaint   Patient presents with     URI       She is an established patient of [facility].    URI Adult    Onset of symptoms was 4 day(s) ago.  Course of illness is same.    Severity moderate  Current and Associated symptoms: cough - non-productive, sore throat and ears clogged,  Treatment measures tried include tea and robitussin.  Predisposing factors include None.        Review of Systems   Constitutional: Negative.    HENT: Positive for sore throat.    Eyes: Negative.    Respiratory: Positive for cough.    Cardiovascular: Negative.    Gastrointestinal: Negative.    Musculoskeletal: Positive for myalgias.   Skin: Negative.    All other systems reviewed and are negative.      Past Medical History:   Diagnosis Date     History of gestational diabetes      Family History   Problem Relation Age of Onset     Hypertension Mother      Current Outpatient Medications   Medication Sig Dispense Refill     benzonatate (TESSALON) 200 MG capsule Take 1 capsule (200 mg) by mouth 3 times daily as needed for cough 30 capsule 0     ibuprofen (ADVIL/MOTRIN) 800 MG tablet Take 1 tablet (800 mg) by mouth every 8 hours as needed for moderate pain 30 tablet 0     Social History     Tobacco Use     Smoking status: Never Smoker     Smokeless tobacco: Never Used   Substance Use Topics     Alcohol use: No       OBJECTIVE  BP (!) 169/104 (BP Location: Left arm, Patient Position: Chair, Cuff Size: Adult Large)   Pulse 97   Temp 98.2  F (36.8  C) (Oral)   Resp 18   Wt 99.3 kg (219 lb)   SpO2 99%   BMI 39.10 kg/m      Physical Exam  Vitals signs and nursing note reviewed.   Constitutional:       General: She is not in acute distress.     Appearance: Normal appearance. She is obese. She is not ill-appearing, toxic-appearing or diaphoretic.   HENT:      Head: Normocephalic and atraumatic.      Right Ear: Tympanic membrane, ear canal  and external ear normal.      Left Ear: Tympanic membrane, ear canal and external ear normal.      Nose: Nose normal.      Mouth/Throat:      Mouth: Mucous membranes are moist.      Pharynx: Oropharynx is clear. Posterior oropharyngeal erythema present.   Eyes:      Extraocular Movements: Extraocular movements intact.      Conjunctiva/sclera: Conjunctivae normal.   Neck:      Musculoskeletal: Normal range of motion and neck supple. No neck rigidity or muscular tenderness.   Cardiovascular:      Rate and Rhythm: Normal rate and regular rhythm.      Pulses: Normal pulses.      Heart sounds: Normal heart sounds.   Pulmonary:      Effort: Pulmonary effort is normal. No respiratory distress.      Breath sounds: Normal breath sounds. No stridor. No wheezing, rhonchi or rales.      Comments: Periscapular tenderness with palpation bilaterally.  And minor tenderness with palpation to sternum.  Chest:      Chest wall: Tenderness present.   Musculoskeletal: Normal range of motion.   Lymphadenopathy:      Cervical: No cervical adenopathy.   Skin:     General: Skin is warm and dry.      Capillary Refill: Capillary refill takes less than 2 seconds.   Neurological:      General: No focal deficit present.      Mental Status: She is alert and oriented to person, place, and time.   Psychiatric:         Mood and Affect: Mood normal.         Behavior: Behavior normal.         Labs:  Results for orders placed or performed in visit on 02/23/20 (from the past 24 hour(s))   Streptococcus A Rapid Scr w Reflx to PCR   Result Value Ref Range    Strep Specimen Description Throat     Streptococcus Group A Rapid Screen Negative NEG^Negative       X-Ray was not done.    ASSESSMENT:      ICD-10-CM    1. Upper respiratory tract infection, unspecified type J06.9 ibuprofen (ADVIL/MOTRIN) 800 MG tablet     benzonatate (TESSALON) 200 MG capsule   2. Sore throat J02.9 Streptococcus A Rapid Scr w Reflx to PCR     Group A Streptococcus PCR Throat Swab         Medical Decision Making:    Differential Diagnosis:  URI Adult/Peds:  Strep pharyngitis, Viral pharyngitis and Viral upper respiratory illness    Serious Comorbid Conditions:  Adult:  None    PLAN:    URI Adult:  Tylenol, Ibuprofen, Fluids, Rest and tessalon perles and ibuporofen    Followup:    If not improving or if condition worsens, follow up with your Primary Care Provider, If not improving or if conditions worsens over the next 12-24 hours, go to the Emergency Department    Patient Instructions       Patient Education     Viral Upper Respiratory Illness (Adult)    You have a viral upper respiratory illness (URI), which is another term for the common cold. This illness is contagious during the first few days. It is spread through the air by coughing and sneezing. It may also be spread by direct contact (touching the sick person and then touching your own eyes, nose, or mouth). Frequent handwashing will decrease risk of spread. Most viral illnesses go away within 7 to 10 days with rest and simple home remedies. Sometimes the illness may last for several weeks. Antibiotics will not kill a virus, and they are generally not prescribed for this condition.  Home care    If symptoms are severe, rest at home for the first 2 to 3 days. When you resume activity, don't let yourself get too tired.    Don't smoke. If you need help stopping, talk with your healthcare provider.    Avoid being exposed to cigarette smoke (yours or others ).    You may use acetaminophen or ibuprofen to control pain and fever, unless another medicine was prescribed. If you have chronic liver or kidney disease, have ever had a stomach ulcer or gastrointestinal bleeding, or are taking blood-thinning medicines, talk with your healthcare provider before using these medicines. Aspirin should never be given to anyone under 18 years of age who is ill with a viral infection or fever. It may cause severe liver or brain damage.    Your appetite may be  poor, so a light diet is fine. Stay well hydrated by drinking 6 to 8 glasses of fluids per day (water, soft drinks, juices, tea, or soup). Extra fluids will help loosen secretions in the nose and lungs.    Over-the-counter cold medicines will not shorten the length of time you re sick, but they may be helpful for the following symptoms: cough, sore throat, and nasal and sinus congestion. If you take prescription medicines, ask your healthcare provider or pharmacist which over-the-counter medicines are safe to use. (Note: Don't use decongestants if you have high blood pressure.)  Follow-up care  Follow up with your healthcare provider, or as advised.  When to seek medical advice  Call your healthcare provider right away if any of these occur:    Cough with lots of colored sputum (mucus)    Severe headache; face, neck, or ear pain    Difficulty swallowing due to throat pain    Fever of 100.4 F (38 C) or higher, or as directed by your healthcare provider  Call 911  Call 911 if any of these occur:    Chest pain, shortness of breath, wheezing, or difficulty breathing    Coughing up blood    Very severe pain with swallowing, especially if it goes along with a muffled voice   Date Last Reviewed: 6/1/2018 2000-2019 [employer]. [address] [place] [address], [place] [address]. All rights reserved. This information is not intended as a substitute for professional medical care. Always follow your healthcare professional's instructions.

## 2020-02-23 NOTE — LETTER
February 24, 2020      Charity SAENZ SPENCER  [address] [place] [address]        Dear Ms.ALVAREZ PALMER,    We are writing to inform you of your test results.    Your test results are negative/normal for throat culture.  Enclosed is a copy of these results.    If you have any questions or concerns, please call the clinic at the number listed above.   Thank you for choosing [facility].      Sincerely,      Karma Gay PA-C        Resulted Orders   Streptococcus A Rapid Scr w Reflx to PCR   Result Value Ref Range    Strep Specimen Description Throat     Streptococcus Group A Rapid Screen Negative NEG^Negative      Comment:      No Group A streptococcal antigen detected by immunoassay. Confirmatory testing   in progress.     Group A Streptococcus PCR Throat Swab   Result Value Ref Range    Specimen Description Throat     Strep Group A PCR Not Detected NDET^Not Detected      Comment:      Group A Streptococcus DNA is not detected.  FDA approved assay performed using [employer] GeneXpert real-time PCR.

## 2020-02-28 ENCOUNTER — OFFICE VISIT (OUTPATIENT)
Dept: OBGYN | Facility: CLINIC | Age: 45
End: 2020-02-28
Payer: COMMERCIAL

## 2020-02-28 VITALS
SYSTOLIC BLOOD PRESSURE: 134 MMHG | HEART RATE: 80 BPM | BODY MASS INDEX: 39.36 KG/M2 | DIASTOLIC BLOOD PRESSURE: 89 MMHG | WEIGHT: 220.5 LBS

## 2020-02-28 DIAGNOSIS — Z30.431 IUD CHECK UP: Primary | ICD-10-CM

## 2020-02-28 PROCEDURE — 99212 OFFICE O/P EST SF 10 MIN: CPT | Performed by: OBSTETRICS & GYNECOLOGY

## 2020-02-28 NOTE — NURSING NOTE
"Chief Complaint   Patient presents with     IUD     Check       Initial /89 (BP Location: Right arm, Cuff Size: Adult Regular)   Pulse 80   Wt 100 kg (220 lb 8 oz)   LMP 02/15/2020   BMI 39.36 kg/m   Estimated body mass index is 39.36 kg/m  as calculated from the following:    Height as of 19: 1.594 m (5' 2.76\").    Weight as of this encounter: 100 kg (220 lb 8 oz).  BP completed using cuff size: regular        The following HM Due: NONE      The following patient reported/Care Every where data was sent to:  P ABSTRACT QUALITY INITIATIVES [[id number]]       Kylie Acevedo MA on 2020 at 8:04 AM           "

## 2020-02-28 NOTE — PROGRESS NOTES
OB/GYN      NAME:  Charity PALMER  PCP:  No Ref-Primary, Physician  MRN:  [id number]    Impression / Plan     44 year old  with:      ICD-10-CM    1. IUD check up Z30.431        Normal exam. Plan follow up for annual exam in Sep 2020.  Seen with professional .      Chief Complaint     Chief Complaint   Patient presents with     IUD     Check       HPI     Charity PALMER is a  44 year old female who is seen for IUD check.    I placed the Mirena IUD 19 for abnormal uterine bleeding.      Patient's last menstrual period was 02/15/2020.     She has had light bleeding, but this is tolerable.  No concerns.  Doing well.      Date of Last Pap Smear:   Lab Results   Component Value Date    PAP NIL 2018         Problem List     Patient Active Problem List    Diagnosis Date Noted     IUD (intrauterine device) in place 2019     Priority: Medium     Mirena placed 19 for contraception.  Replace/remove Dec 2024       Subclinical hyperthyroidism 2018     Priority: Medium     Monocular diplopia of left eye 2018     Priority: Medium     History of spontaneous  2018     Priority: Medium     Elevated blood pressure reading without diagnosis of hypertension 2018     Priority: Medium       Medications     Current Outpatient Medications   Medication     benzonatate (TESSALON) 200 MG capsule     ibuprofen (ADVIL/MOTRIN) 800 MG tablet     No current facility-administered medications for this visit.         Allergies     No Known Allergies    ROS     A 6 organ review of systems was asked and the pertinent positives and negatives are listed in the HPI. All other organ systems can be considered negative.     Physical Exam   Vitals: /89 (BP Location: Right arm, Cuff Size: Adult Regular)   Pulse 80   Wt 100 kg (220 lb 8 oz)   LMP 02/15/2020   BMI 39.36 kg/m      General: Comfortable, no obvious distress, normal  body habitus  Psych: Alert  and orientated x 3. Appropriate affect, good insight.   : Normal female external genitalia.  No lesions.  Urethral meatus normal.  Speculum exam reveals a normal vaginal vault, normal cervix.  IUD strings seen and normal.    Extremities: No peripheral edema, nontender         12 minutes was spent with patient, more than 50% counseling and coordinating care as noted above in the A/P    Nursing notes read and reviewed    Kylie Stafford MD

## 2020-09-30 ENCOUNTER — OFFICE VISIT (OUTPATIENT)
Dept: OBGYN | Facility: CLINIC | Age: 45
End: 2020-09-30
Payer: COMMERCIAL

## 2020-09-30 VITALS
SYSTOLIC BLOOD PRESSURE: 151 MMHG | DIASTOLIC BLOOD PRESSURE: 101 MMHG | WEIGHT: 222.6 LBS | BODY MASS INDEX: 39.44 KG/M2 | HEART RATE: 75 BPM | HEIGHT: 63 IN | OXYGEN SATURATION: 96 %

## 2020-09-30 DIAGNOSIS — Z97.5 IUD (INTRAUTERINE DEVICE) IN PLACE: ICD-10-CM

## 2020-09-30 DIAGNOSIS — Z01.419 WELL FEMALE EXAM WITH ROUTINE GYNECOLOGICAL EXAM: Primary | ICD-10-CM

## 2020-09-30 DIAGNOSIS — Z12.31 VISIT FOR SCREENING MAMMOGRAM: ICD-10-CM

## 2020-09-30 DIAGNOSIS — L28.0 LICHEN SIMPLEX CHRONICUS: ICD-10-CM

## 2020-09-30 DIAGNOSIS — E05.90 SUBCLINICAL HYPERTHYROIDISM: ICD-10-CM

## 2020-09-30 DIAGNOSIS — R03.0 ELEVATED BLOOD PRESSURE READING WITHOUT DIAGNOSIS OF HYPERTENSION: ICD-10-CM

## 2020-09-30 LAB
ANION GAP SERPL CALCULATED.3IONS-SCNC: 4 MMOL/L (ref 3–14)
BUN SERPL-MCNC: 12 MG/DL (ref 7–30)
CALCIUM SERPL-MCNC: 8.8 MG/DL (ref 8.5–10.1)
CHLORIDE SERPL-SCNC: 107 MMOL/L (ref 94–109)
CHOLEST SERPL-MCNC: 98 MG/DL
CO2 SERPL-SCNC: 27 MMOL/L (ref 20–32)
CREAT SERPL-MCNC: 0.43 MG/DL (ref 0.52–1.04)
GFR SERPL CREATININE-BSD FRML MDRD: >90 ML/MIN/{1.73_M2}
GLUCOSE SERPL-MCNC: 92 MG/DL (ref 70–99)
HDLC SERPL-MCNC: 42 MG/DL
LDLC SERPL CALC-MCNC: 45 MG/DL
NONHDLC SERPL-MCNC: 56 MG/DL
POTASSIUM SERPL-SCNC: 3.9 MMOL/L (ref 3.4–5.3)
SODIUM SERPL-SCNC: 138 MMOL/L (ref 133–144)
TRIGL SERPL-MCNC: 55 MG/DL
TSH SERPL DL<=0.005 MIU/L-ACNC: 1.07 MU/L (ref 0.4–4)

## 2020-09-30 PROCEDURE — 99396 PREV VISIT EST AGE 40-64: CPT | Performed by: OBSTETRICS & GYNECOLOGY

## 2020-09-30 PROCEDURE — 84443 ASSAY THYROID STIM HORMONE: CPT | Performed by: OBSTETRICS & GYNECOLOGY

## 2020-09-30 PROCEDURE — 80048 BASIC METABOLIC PNL TOTAL CA: CPT | Performed by: OBSTETRICS & GYNECOLOGY

## 2020-09-30 PROCEDURE — 36415 COLL VENOUS BLD VENIPUNCTURE: CPT | Performed by: OBSTETRICS & GYNECOLOGY

## 2020-09-30 PROCEDURE — 80061 LIPID PANEL: CPT | Performed by: OBSTETRICS & GYNECOLOGY

## 2020-09-30 RX ORDER — TRIAMCINOLONE ACETONIDE 5 MG/G
OINTMENT TOPICAL
Qty: 1 TUBE | Refills: 3 | Status: SHIPPED | OUTPATIENT
Start: 2020-09-30 | End: 2021-03-09

## 2020-09-30 ASSESSMENT — MIFFLIN-ST. JEOR: SCORE: 1636.22

## 2020-09-30 NOTE — PATIENT INSTRUCTIONS
Please make an appointment with Family Medicine for blood pressure management.      Do not use the ointment on the vulva for longer than a couple of months.  Please make a follow up appointment if your symptoms worsen or do not improve.

## 2020-09-30 NOTE — PROGRESS NOTES
"   SUBJECTIVE:   CC: Charity PALMER is an 45 year old woman who presents for preventive health visit.       Patient has been advised of split billing requirements and indicates understanding: Yes  Healthy Habits:    Getting at least 3 servings of Calcium per day:  NO    Bi-annual eye exam:  Yes    Dental care twice a year:  Yes    Sleep apnea or symptoms of sleep apnea:  None    Diet:  Regular (no restrictions)    Frequency of exercise:  None    Duration of exercise:  N/A    Taking medications regularly:  No    Barriers to taking medications:  Not applicable    Medication side effects:  Not applicable    PHQ-2 Total Score:    Additional concerns today:  No        .  History of gestational diabetes.    Blood pressure was also elevated last time I saw her in the office.   Contraception  - Mirena IUD.   Breast feeding.     \"allergy\" on her private part.  Dry and itchy.      Today's PHQ-2 Score:   PHQ-2 (  [employer]) 2018   Q1: Little interest or pleasure in doing things 0   Q2: Feeling down, depressed or hopeless 0   PHQ-2 Score 0       Abuse: Current or Past (Physical, Sexual or Emotional) - No  Do you feel safe in your environment? Yes        Social History     Tobacco Use     Smoking status: Never Smoker     Smokeless tobacco: Never Used   Substance Use Topics     Alcohol use: No     If you drink alcohol do you typically have >3 drinks per day or >7 drinks per week? No    No flowsheet data found.    BP Readings from Last 3 Encounters:   20 (!) 151/101   20 134/89   20 (!) 169/104    Wt Readings from Last 3 Encounters:   20 101 kg (222 lb 9.6 oz)   20 100 kg (220 lb 8 oz)   20 99.3 kg (219 lb)                  Patient Active Problem List   Diagnosis     Elevated blood pressure reading without diagnosis of hypertension     History of spontaneous      Monocular diplopia of left eye     Subclinical hyperthyroidism     IUD (intrauterine device) in place     " History reviewed. No pertinent surgical history.    Social History     Tobacco Use     Smoking status: Never Smoker     Smokeless tobacco: Never Used   Substance Use Topics     Alcohol use: No     Family History   Problem Relation Age of Onset     Hypertension Mother      Myocardial Infarction Mother 62         Current Outpatient Medications   Medication Sig Dispense Refill     triamcinolone (KENALOG) 0.5 % external ointment Apply sparingly to affected area twice daily for two weeks, then twice per week as needed. 1 Tube 3     No Known Allergies  Recent Labs   Lab Test 09/30/19  0711 11/02/18  1602 08/03/18  1510  06/15/18  1609   A1C  --   --   --   --  5.0   LDL 41  --   --   --   --    HDL 40*  --   --   --   --    TRIG 65  --   --   --   --    ALT 26  --  27  --   --    CR 0.50*  --  0.45*  --   --    GFRESTIMATED >90  --  >90  --   --    GFRESTBLACK >90  --  >90  --   --    POTASSIUM 3.9  --  3.8  --   --    TSH 1.52 0.82  --    < > 1.35    < > = values in this interval not displayed.        Mammogram Screening: Patient under age 50, mutual decision reflected in health maintenance.      History of abnormal Pap smear: NO - age 30-65 PAP every 5 years with negative HPV co-testing recommended  PAP / HPV Latest Ref Rng & Units 8/3/2018   PAP - NIL   HPV 16 DNA NEG:Negative Negative   HPV 18 DNA NEG:Negative Negative   OTHER HR HPV NEG:Negative Negative         Review of Systems  CONSTITUTIONAL: NEGATIVE for fever, chills, change in weight  INTEGUMENTARU/SKIN: NEGATIVE for worrisome rashes, moles or lesions  EYES: NEGATIVE for vision changes or irritation  ENT: NEGATIVE for ear, mouth and throat problems  RESP: NEGATIVE for significant cough or SOB  BREAST: NEGATIVE for masses, tenderness or discharge  CV: NEGATIVE for chest pain, palpitations or peripheral edema  GI: NEGATIVE for nausea, abdominal pain, heartburn, or change in bowel habits  : NEGATIVE for unusual urinary or vaginal symptoms. Last period was about  "three months ago.  Does not have any problems with the IUD or bleeding.   MUSCULOSKELETAL: NEGATIVE for significant arthralgias or myalgia  NEURO: NEGATIVE for weakness, dizziness or paresthesias  PSYCHIATRIC: NEGATIVE for changes in mood or affect     OBJECTIVE:   BP (!) 151/101 (BP Location: Right arm, Cuff Size: Adult Regular)   Pulse 75   Ht 1.604 m (5' 3.15\")   Wt 101 kg (222 lb 9.6 oz)   LMP 06/17/2020 (Approximate)   SpO2 96%   BMI 39.24 kg/m    Physical Exam    Gen: Alert and oriented times 3, no acute distress.  Well developed, well nourished, pleasant.    Neck: Supple, no masses.  No thyromegaly.  Breast: Symmetrical without lesions.  No dimpling, nipple discharge, or discrete masses.  No lymphadenopathy.  Chest:  Non labored.  Clear to auscultation bilaterally.    Heart: Regular, normal S1, S2.  No murmurs.   Abdomen: Soft, nontender, nondistended.  No hepatosplenomegaly.    :  Erythematous and thickened vulva, otherwise normal female external genitalia.  No lesions.  Urethral meatus normal.  Speculum exam reveals a normal vaginal vault, normal cervix . IUD strings seen. No abnormal discharge.  Bimanual exam reveals a normal, mobile, nontender uterus .  No cervical motion tenderness.  Adnexa nontender with no palpable masses.    Extremities:  Nontender, no edema.      ASSESSMENT/PLAN:       ICD-10-CM    1. Well female exam with routine gynecological exam  Z01.419    2. IUD (intrauterine device) in place  Z97.5    3. Subclinical hyperthyroidism  E05.90 TSH with free T4 reflex   4. Visit for screening mammogram  Z12.31 *MA Screening Digital Bilateral   5. Elevated blood pressure reading without diagnosis of hypertension  R03.0 Lipid Profile (Chol, Trig, HDL, LDL calc)     Basic metabolic panel  (Ca, Cl, CO2, Creat, Gluc, K, Na, BUN)   6. Lichen simplex chronicus  L28.0 triamcinolone (KENALOG) 0.5 % external ointment       Recommend she establish care with family medicine for management of elevated " "BP.  Normal cholesterol and TSH last year. Normal CMP with normal glucose last year.  Will repeat labs today.  She is fasting.     She is breast feeding and prefers to get the mammogram when she is done breast feeding.    Vulvar itching - plan steroid ointment. Instructions and precautions given.     used.     Patient has been advised of split billing requirements and indicates understanding: Yes  COUNSELING:  Reviewed preventive health counseling, as reflected in patient instructions    Estimated body mass index is 39.24 kg/m  as calculated from the following:    Height as of this encounter: 1.604 m (5' 3.15\").    Weight as of this encounter: 101 kg (222 lb 9.6 oz).    Weight management plan: recommend diet, exercise    She reports that she has never smoked. She has never used smokeless tobacco.      Counseling Resources:  ATP IV Guidelines  Pooled Cohorts Equation Calculator  Breast Cancer Risk Calculator  BRCA-Related Cancer Risk Assessment: FHS-7 Tool  FRAX Risk Assessment  ICSI Preventive Guidelines  Dietary Guidelines for Americans, 2010  USDA's MyPlate  ASA Prophylaxis  Lung CA Screening    Kylie Stafford MD  [facility]  "

## 2020-10-01 ENCOUNTER — APPOINTMENT (OUTPATIENT)
Dept: INTERPRETER SERVICES | Facility: CLINIC | Age: 45
End: 2020-10-01
Payer: COMMERCIAL

## 2020-10-08 ENCOUNTER — OFFICE VISIT (OUTPATIENT)
Dept: PEDIATRICS | Facility: CLINIC | Age: 45
End: 2020-10-08
Payer: COMMERCIAL

## 2020-10-08 VITALS
TEMPERATURE: 96.2 F | DIASTOLIC BLOOD PRESSURE: 70 MMHG | OXYGEN SATURATION: 98 % | BODY MASS INDEX: 38.95 KG/M2 | WEIGHT: 220.9 LBS | HEART RATE: 72 BPM | SYSTOLIC BLOOD PRESSURE: 121 MMHG

## 2020-10-08 DIAGNOSIS — R03.0 ELEVATED BLOOD PRESSURE READING WITHOUT DIAGNOSIS OF HYPERTENSION: Primary | ICD-10-CM

## 2020-10-08 DIAGNOSIS — I10 HYPERTENSION GOAL BP (BLOOD PRESSURE) < 130/80: ICD-10-CM

## 2020-10-08 PROCEDURE — 99214 OFFICE O/P EST MOD 30 MIN: CPT | Performed by: NURSE PRACTITIONER

## 2020-10-08 NOTE — PATIENT INSTRUCTIONS
PLAN:   1.   Symptomatic therapy suggested: monitor salt intake .  2.  Orders Placed This Encounter   Procedures     24 Hour Blood Pressure Monitor - Adult     3. Patient needs to follow up in if no improvement,or sooner if worsening of symptoms or other symptoms develop.  FURTHER TESTING:       - 24 hour Blood pressure monitor   Will follow up and/or notify patient on results via phone or mail to determine further need for followup           Patient Education     [place] Presión Arterial -- Por Confirmar [Sin Tratamiento] [High Blood Pressure -- To Be Confirmed, No Tx]    Hoy, lopez presión arterial era más jose alfredo que lo normal. En ocasiones, la ansiedad o el dolor pueden provocar un aumento temporal de la presión arterial, cristal ésta después regresa a la normalidad. Si tiene la presión arterial jose alfredo en alfredo cesilia medición, ello no significa que usted tiene hipertensión (la cual es alfredo enfermedad crónica). Sin embargo, deberá tomarse la presión arterial otra vez dentro de los próximos días para nilson si continúa siendo jose alfredo.  Alfredo presión arterial normal es 120/80 o menos. El primer número (120 en christi montrell) indica la presión  sistólica . El imtiaz número (80 en christi montrell) indica la presión  diastólica . Se dice que hay hipertensión cuando el primer número es 140 o mayor, o cuando el imtiaz número es 90 o mayor en repetidas ocasiones en que se mida la presión.  Si la presión arterial se encuentra dentro de los 120-140 (sistólica) o dentro de los 80-89 (diastólica) se considera que la persona tiene  pre-hipertensión . Ello significa que usted está en riesgo de tener hipertensión. Debe controlarse la presión arterial de manera regular para asegurarse de que no está subiendo.  Cuidados En [place]:  1. Tómese la presión arterial en 3 días diferentes y anote los resultados. Puede hacerlo en el consultorio de lopez médico o en christi centro. Algunas farmacias y tiendas de alimentos también tienen máquinas automatizadas en las que puede  tomarse la presión.  Visita De Control:  Si lopez presión arterial es  jose alfredo  (por encima de 120/80) 2 de esos 3 días, tendrá que visitar a lopez médico para que lo evalúe mejor y le indique el tratamiento que sea necesario.   NO LO DEJE PARA MÁS ADELANTE! La presión arterial jose alfredo no tratada adecuadamente aumenta el riesgo de tener un ataque al corazón o un ataque cerebral. Es alfredo enfermedad que se puede tratar.  Busque Prontamente Atención Médica  si algo de lo siguiente ocurre:    Dolor en el pecho o falta de aire.    Dolor de senia fadi.    Dolor punzante o zumbido en los oídos.    Hemorragia nasal.    Dolor abdominal fadi y repentino.    Señales de un ataque cerebral:    Debilidad en un brazo o alfredo pierna, o en un lado de la marco.    Dificultades para hablar o nilson.    Gran somnolencia, confusión, mareo o desmayos.  Date Last Reviewed: 12/1/2016 2000-2019 [employer]. [address] [place] [id number]. Todos los derechos reservados. Esta información no pretende sustituir la atención médica profesional. Sólo lopez médico puede diagnosticar y tratar un problema de alida.

## 2020-10-08 NOTE — NURSING NOTE
"Chief Complaint   Patient presents with     Hypertension       Initial /70 (BP Location: Right arm, Patient Position: Sitting, Cuff Size: Adult Large)   Pulse 72   Temp 96.2  F (35.7  C) (Temporal)   Wt 100.2 kg (220 lb 14.4 oz)   LMP  (LMP Unknown)   SpO2 98%   Breastfeeding Yes   BMI 38.95 kg/m   Estimated body mass index is 38.95 kg/m  as calculated from the following:    Height as of 9/30/20: 1.604 m (5' 3.15\").    Weight as of this encounter: 100.2 kg (220 lb 14.4 oz).  Medication Reconciliation: complete      SHAWNA Steele      "

## 2020-10-08 NOTE — PROGRESS NOTES
Subjective     Charity PALMER is a 45 year old female who presents to clinic today for the following health issues:    HPI     Patient has had elevated BP readings during her visits. Has never been on medications.  Concerned may need to be on blood pressure med    Labs reviewed in EPIC  BP Readings from Last 3 Encounters:   10/08/20 121/70   20 (!) 151/101   20 134/89    Wt Readings from Last 3 Encounters:   10/08/20 100.2 kg (220 lb 14.4 oz)   20 101 kg (222 lb 9.6 oz)   20 100 kg (220 lb 8 oz)            Patient Active Problem List   Diagnosis     Elevated blood pressure reading without diagnosis of hypertension     History of spontaneous      Monocular diplopia of left eye     Subclinical hyperthyroidism     IUD (intrauterine device) in place     History reviewed. No pertinent surgical history.    Social History     Tobacco Use     Smoking status: Never Smoker     Smokeless tobacco: Never Used   Substance Use Topics     Alcohol use: No     Family History   Problem Relation Age of Onset     Hypertension Mother      Myocardial Infarction Mother 62         Current Outpatient Medications   Medication Sig Dispense Refill     triamcinolone (KENALOG) 0.5 % external ointment Apply sparingly to affected area twice daily for two weeks, then twice per week as needed. 1 Tube 3     No Known Allergies      Review of Systems   CONSTITUTIONAL:NEGATIVE for fever, chills, change in weight  ENT/MOUTH: NEGATIVE for ear, mouth and throat problems  RESP:NEGATIVE for significant cough or SOB  CV: NEGATIVE for chest pain, palpitations or peripheral edema  GI: NEGATIVE for nausea, abdominal pain, heartburn, or change in bowel habits  MUSCULOSKELETAL: NEGATIVE for significant arthralgias or myalgia  NEURO: NEGATIVE for weakness, dizziness or paresthesias  PSYCHIATRIC: NEGATIVE for changes in mood or affect      Objective    /70 (BP Location: Right arm, Patient Position: Sitting, Cuff Size:  Adult Large)   Pulse 72   Temp 96.2  F (35.7  C) (Temporal)   Wt 100.2 kg (220 lb 14.4 oz)   LMP  (LMP Unknown)   SpO2 98%   Breastfeeding Yes   BMI 38.95 kg/m    Body mass index is 38.95 kg/m .   BP Readings from Last 6 Encounters:   10/08/20 121/70   09/30/20 (!) 151/101   02/28/20 134/89   02/23/20 (!) 169/104   12/20/19 (!) 151/89   09/24/19 134/89       Physical Exam   GENERAL APPEARANCE: alert, active, no distress and Nourishment obese   NECK: no adenopathy, no asymmetry, masses, or scars and thyroid normal to palpation  RESP: lungs clear to auscultation - no rales, rhonchi or wheezes  CV: regular rates and rhythm and no murmur, click or rub  ABDOMEN: soft, non-tender  MS: extremities normal- no gross deformities noted  SKIN: Skin color, texture, turgor normal.   NEURO: Normal strength and tone, mentation intact and speech normal  PSYCH: mentation appears normal and affect normal/bright    Diagnostic Test Results:  Labs reviewed in Epic  Pending orders and results           Assessment & Plan     Charity was seen today for hypertension.    Diagnoses and all orders for this visit:    Elevated blood pressure reading without diagnosis of hypertension  -     24 Hour Blood Pressure Monitor - Adult; Future  Discussed sodium restriction, maintaining ideal body weight and regular exercise program as physiologic means to achieve blood pressure control.  The pt will strive towards this.  Discussed long term complications of untreated htn.  Will follow up and/or notify patient on results via phone or mail to determine further need for followup          See Patient Instructions  Patient Instructions     PLAN:   1.   Symptomatic therapy suggested: monitor salt intake .  2.  Orders Placed This Encounter   Procedures     24 Hour Blood Pressure Monitor - Adult     3. Patient needs to follow up in if no improvement,or sooner if worsening of symptoms or other symptoms develop.  FURTHER TESTING:       - 24 hour Blood  pressure monitor   Will follow up and/or notify patient on results via phone or mail to determine further need for followup           Patient Education     [place]ay Presión Arterial -- Por Confirmar [Sin Tratamiento] [High Blood Pressure -- To Be Confirmed, No Tx]    Hoy, lopez presión arterial era más jose alfredo que lo normal. En ocasiones, la ansiedad o el dolor pueden provocar un aumento temporal de la presión arterial, cristal ésta después regresa a la normalidad. Si tiene la presión arterial jose alfredo en alfredo cesilia medición, ello no significa que usted tiene hipertensión (la cual es alfredo enfermedad crónica). Sin embargo, deberá tomarse la presión arterial otra vez dentro de los próximos días para nilson si continúa siendo jose alfredo.  Alfredo presión arterial normal es 120/80 o menos. El primer número (120 en christi montrell) indica la presión  sistólica . El imtiaz número (80 en christi montrell) indica la presión  diastólica . Se dice que hay hipertensión cuando el primer número es 140 o mayor, o cuando el imtiaz número es 90 o mayor en repetidas ocasiones en que se mida la presión.  Si la presión arterial se encuentra dentro de los 120-140 (sistólica) o dentro de los 80-89 (diastólica) se considera que la persona tiene  pre-hipertensión . Ello significa que usted está en riesgo de tener hipertensión. Debe controlarse la presión arterial de manera regular para asegurarse de que no está subiendo.  Cuidados En [place]:  1. Tómese la presión arterial en 3 días diferentes y anote los resultados. Puede hacerlo en el consultorio de lopez médico o en christi centro. Algunas farmacias y tiendas de alimentos también tienen máquinas automatizadas en las que puede tomarse la presión.  Visita De Control:  Si lopez presión arterial es  jose alfredo  (por encima de 120/80) 2 de esos 3 días, tendrá que visitar a lopez médico para que lo evalúe mejor y le indique el tratamiento que sea necesario.   NO LO DEJE PARA MÁS ADELANTE! La presión arterial jose alfredo no tratada adecuadamente aumenta el  riesgo de tener un ataque al corazón o un ataque cerebral. Es alfredo enfermedad que se puede tratar.  Busque Prontamente Atención Médica  si algo de lo siguiente ocurre:    Dolor en el pecho o falta de aire.    Dolor de senia fadi.    Dolor punzante o zumbido en los oídos.    Hemorragia nasal.    Dolor abdominal fadi y repentino.    Señales de un ataque cerebral:    Debilidad en un brazo o alfredo pierna, o en un lado de la marco.    Dificultades para hablar o nilson.    Gran somnolencia, confusión, mareo o desmayos.  Date Last Reviewed: 12/1/2016 2000-2019 [email or web address]. [address] [place] [id number]. Todos los derechos reservados. Esta información no pretende sustituir la atención médica profesional. Sólo lopez médico puede diagnosticar y tratar un problema de alida.               No follow-ups on file.    Tanja Townsend, APRN [facility]

## 2020-10-13 ENCOUNTER — APPOINTMENT (OUTPATIENT)
Dept: INTERPRETER SERVICES | Facility: CLINIC | Age: 45
End: 2020-10-13
Payer: COMMERCIAL

## 2020-10-14 ENCOUNTER — ANCILLARY PROCEDURE (OUTPATIENT)
Dept: GENERAL RADIOLOGY | Facility: CLINIC | Age: 45
End: 2020-10-14
Payer: COMMERCIAL

## 2020-10-21 RX ORDER — LISINOPRIL 5 MG/1
5 TABLET ORAL DAILY
Qty: 90 TABLET | Refills: 1 | Status: SHIPPED | OUTPATIENT
Start: 2020-10-21 | End: 2021-02-23

## 2020-10-22 ENCOUNTER — APPOINTMENT (OUTPATIENT)
Dept: INTERPRETER SERVICES | Facility: CLINIC | Age: 45
End: 2020-10-22
Payer: COMMERCIAL

## 2021-02-23 ENCOUNTER — OFFICE VISIT (OUTPATIENT)
Dept: FAMILY MEDICINE | Facility: CLINIC | Age: 46
End: 2021-02-23
Payer: COMMERCIAL

## 2021-02-23 VITALS
RESPIRATION RATE: 16 BRPM | BODY MASS INDEX: 38.98 KG/M2 | OXYGEN SATURATION: 97 % | WEIGHT: 220 LBS | DIASTOLIC BLOOD PRESSURE: 87 MMHG | HEIGHT: 63 IN | HEART RATE: 82 BPM | SYSTOLIC BLOOD PRESSURE: 127 MMHG | TEMPERATURE: 98.7 F

## 2021-02-23 DIAGNOSIS — I10 HTN, GOAL BELOW 140/90: Primary | ICD-10-CM

## 2021-02-23 DIAGNOSIS — H92.02 OTALGIA, LEFT: ICD-10-CM

## 2021-02-23 DIAGNOSIS — H93.8X1 SENSATION OF FULLNESS IN RIGHT EAR: ICD-10-CM

## 2021-02-23 LAB
ANION GAP SERPL CALCULATED.3IONS-SCNC: 3 MMOL/L (ref 3–14)
BUN SERPL-MCNC: 12 MG/DL (ref 7–30)
CALCIUM SERPL-MCNC: 9.2 MG/DL (ref 8.5–10.1)
CHLORIDE SERPL-SCNC: 109 MMOL/L (ref 94–109)
CO2 SERPL-SCNC: 27 MMOL/L (ref 20–32)
CREAT SERPL-MCNC: 0.56 MG/DL (ref 0.52–1.04)
GFR SERPL CREATININE-BSD FRML MDRD: >90 ML/MIN/{1.73_M2}
GLUCOSE SERPL-MCNC: 106 MG/DL (ref 70–99)
POTASSIUM SERPL-SCNC: 3.7 MMOL/L (ref 3.4–5.3)
SODIUM SERPL-SCNC: 139 MMOL/L (ref 133–144)

## 2021-02-23 PROCEDURE — 36415 COLL VENOUS BLD VENIPUNCTURE: CPT | Performed by: NURSE PRACTITIONER

## 2021-02-23 PROCEDURE — 99214 OFFICE O/P EST MOD 30 MIN: CPT | Performed by: NURSE PRACTITIONER

## 2021-02-23 PROCEDURE — 80048 BASIC METABOLIC PNL TOTAL CA: CPT | Performed by: NURSE PRACTITIONER

## 2021-02-23 RX ORDER — NIFEDIPINE 30 MG
30 TABLET, EXTENDED RELEASE ORAL DAILY
Qty: 30 TABLET | Refills: 1 | Status: SHIPPED | OUTPATIENT
Start: 2021-02-23 | End: 2021-03-09

## 2021-02-23 ASSESSMENT — MIFFLIN-ST. JEOR: SCORE: 1622.04

## 2021-02-23 ASSESSMENT — PAIN SCALES - GENERAL: PAINLEVEL: NO PAIN (0)

## 2021-02-23 NOTE — PATIENT INSTRUCTIONS
At [facility], we strive to deliver an exceptional experience to you, every time we see you. If you receive a survey, please complete it as we do value your feedback.  If you have MyChart, you can expect to receive results automatically within 24 hours of their completion.  Your provider will send a note interpreting your results as well.   If you do not have MyChart, you should receive your results in about a week by mail.    Your care team:                            Family Medicine Internal Medicine   MD Ramakrishna Patton MD Shantel Branch-Fleming, MD Srinivasa Vaka, MD Katya Belousova, PAKevinC  Chelsi Peace, APRN CNP    Alexys Mcnair, MD Pediatrics   Jese Krueger, PAKevinC  Meagan Underwood, CNP MD Kavita Arcos APRN CNP   MD Celia Izquierdo MD Deborah Mielke, MD Kim Thein, APRN [facility]      Clinic hours: Monday - Thursday 7 am-6 pm; Fridays 7 am-5 pm.   Urgent care: Monday - Friday 11 am-9 pm; Saturday and Sunday 9 am-5 pm.    Clinic: [phone]       [facility]: Monday - Thursday 8 am - 7 pm; Friday 8 am - 6 pm  [facility]: [phone]     Use www.[email or web address].org for 24/7 diagnosis and treatment of dozens of conditions.  Patient Education     Dolor de oído sin infección (adultos)  El dolor de oído puede darse sin que haya alfredo infección. Puede suceder cuando se acumulan aire y líquido detrás del tímpano. [place] puede causar alfredo sensación de presión y malestar. También pueden dificultar la audición. A esta afección se la llama otitis media con efusión (OME) u otitis media serosa. Significa que hay líquido en el oído medio. No es lo mismo que la otitis media aguda, que suele deberse a alfredo infección.   La OME puede ocurrir cuando tiene un resfriado si la congestión llega a bloquear el conducto por el que drena el oído medio. Parris conducto se denomina trompa de Rahat. También  puede darse cuando hay alergias nasales o después de edison tenido alfredo infección bacteriana en el oído medio. Otras causas son las siguientes:     Traumatismo    Limpieza incorrecta de la cera del oído    Infección bacteriana en la apófisis mastoides (mastoiditis)    Tumor    Dolor en la mandíbula    Cambios de presión, por ejemplo, si viaja en avión o hace buceo    Es posible que el dolor aparezca y desaparezca. Quizás oiga sonidos tales alex chasquidos u otros ruidos similares al masticar o al tragar. Puede sentir que pierde el equilibrio. O felicia, puede que escuche un zumbido en los oídos.   El líquido suele demorar entre varias semanas y hasta 3 meses en irse por lopez cuenta. Los calmantes que se dorothea por la boca y las gotas para los oídos ayudan a aliviar el dolor. Los descongestivos y los antihistamínicos a veces también ayudan. Esta enfermedad no responde a los antibióticos porque no hay infección. Lopez proveedor de atención médica puede recetarle un aerosol nasal para ayudar a reducir la hinchazón en la nariz y en las trompas de Rahat. [place] puede permitirle al oído que drene.   Si no se siente mejor después de los 3 meses, es posible que haya que practicarle alfredo cirugía para extraer el líquido. Puede que también se coloque un pequeño tubo en el tímpano para favorecer el drenaje.   Dado que el líquido del oído medio se puede infectar, es importante observar si aparecen señales de alfredo infección. Estos pueden aparecer después. Pueden incluir aumento de dolor en el oído, fiebre o secreción procedente del oído.   Cuidados en el hogar  Estos consejos lo ayudarán a cuidarse en lopez casa:    Puede usar medicamentos de venta uzair según las indicaciones de lopez proveedor de atención médica para controlar el dolor, a menos que le haya recetado otro medicamento. Si tiene alfredo enfermedad hepática o renal crónica o si ha tenido alguna vez alfredo úlcera estomacal o sangrado gastrointestinal, consulte con lopez proveedor de atención  médica antes de usar estos medicamentos.    Nunca le dé aspirina a un fany de 18 años que tenga fiebre ya que puede provocar daños graves en el hígado.    Pregunte a lopez proveedor de atención médica si puede usar descongestivos de venta uzair, alex la pseudoefedrina o la fenilefrina. Miguel tenga presente que no siempre ayudan.    Hable con lopez proveedor de atención médica sobre el uso de aerosoles nasales descongestivos. No los use por más de 3 días, o según le haya indicado el proveedor de atención médica El uso prolongado puede empeorar la congestión. Los aerosoles nasales recetados por lopez proveedor de atención médica no suelen tener esas restricciones.    Los antihistamínicos pueden ayudar si también tiene síntomas de alergia.    También puede utilizar medicamentos tales alex guaifenesina para disminuir la mucosidad y favorecer el drenaje.  Visitas de control  Ivana alfredo visita de control a lopez proveedor de atención médica, o según le indiquen, si no empieza a sentirse mejor después de 3 días.   Cuándo debe buscar atención médica  Llame a lopez proveedor de atención médica de inmediato ante cualquiera de las siguientes situaciones:     El dolor de oído empeora o no comienza a mejorar.     Fiebre por encima de 100,4  F (38  C) o según la indicación de lopez proveedor de atención médica.    [place] de líquido o cherelle del oído.    Dolor de senia o de los senos nasales.    Rigidez en el kendrick.    Somnolencia inusual o confusión.    [id number] [employer]. [address] [place] [id number]. Todos los derechos reservados. Esta información no pretende sustituir la atención médica profesional. Sólo lopez médico puede diagnosticar y tratar un problema de alida.           Patient Education     Elnaa Presión Arterial --Establecida [High Blood Pressure -- Established]    La elana presión arterial (hipertensión) es alfredo enfermedad crónica. En la mayoría de los casos, se desconoce la causa. Por lo general, se la puede  mantener bajo control modificando algunos hábitos o con ayuda de ciertos medicamentos. Algunos de los síntomas de la jose alfredo presión arterial pueden ser dolor de senia, mareos, cambios en la visión, dolor en el pecho, falta de aire. En ocasiones, la persona no presenta ningún síntoma. Sin embargo, incluso aunque no haya síntomas, no se debe dejar la jose alfredo presión arterial sin tratamiento porque ello incrementa el riesgo de tener un ataque al corazón y un ataque cerebral. Se trata de un supriya riesgo para la alida y no se lo debe pasar por alto.  Alfredo presión arterial normal es 120/80 o menos. El primer número (120 en chrsiti montrell) indica la presión  sistólica . El imtiaz número (80 en christi montrell) indica la presión  diastólica . Se dice que hay hipertensión cuando el primer número es 140 o mayor, o cuando el imtiaz número es 90 o mayor en repetidas ocasiones en que se mida la presión.  Cuidados En [place]:  Todos los pacientes que tengan presión arterial jose alfredo deben hacer lo siguiente para bajarla: Si está tomando medicamentos, entonces estos métodos pueden reducir o eliminar la necesidad de que siga tomándolos en el futuro.  1. Si está excedido de peso, comience un programa para adelgazar.  2. Reduzca la cantidad de sal que consume.  ? Evite los alimentos con alto contenido de sodio (aceitunas, encurtidos [pickles] , brenda ahumadas, latonya fritas, etc.).  ? No agregue jasmeet a las comidas en el momento de comerlas.  ? Sólo use pequeñas cantidades de sal al cocinar.  3. Comience un programa de ejercicios. Hable con lopez médico para nilson qué tipo de programa de ejercicios sería el mejor para usted. No tiene por qué ser difícil. Incluso alfredo caminata enérgica de 20 minutos sharona veces por semana es alfredo buena manera de ejercitarse.  4. Evite jero medicamentos que tengan estimulantes del corazón. Por ejemplo, muchas de las pastillas y sprays descongestionantes para los senos paranasales y los que se usan para resfriados, así alex las  pastillas para adelgazar. María Elena el prospecto para conocer las advertencias que el medicamento tenga respecto de la hipertensión. Los estimulantes tales laex las anfetaminas o la cocaína pueden ser letales para alfredo persona hipertensa. Evítelos siempre.  5. Limite la cantidad de cafeína que consume o elija productos descafeinados.  6. Deje de fumar. Si hace muchos años que fuma, esto puede ser difícil. Inscríbase en un programa antitabaco para tener mayores probabilidades de lograrlo.  7. Aprender a manejar mejor el estrés es muy importante también para bajar la presión arterial. Aprenda métodos de relajación, tales alex meditación, yoga o biofeedback .  8. Si le recetaron medicamentos, tómelos exactamente maría elena alex se lo indicaron. Si se saltea alguna dosis, lopez presión arterial puede salirse de control.  9. Piense en comprar alfredo máquina automática para medir la presión arterial (se venden en la mayoría de las farmacias). Puede usarla para tomarse la presión arterial en lopez casa y después informar a lopez médico sobre los resultados.  Visita De Control:  Es importante que visite regularmente a lopez médico para que le tome la presión arterial y le ajuste la dosis de medicamento según sea necesario. Ivana alfredo visita de control, según le indique nuestro personal médico.  Busque Prontamente Atención Médica  si algo de lo siguiente ocurre:    Dolor en el pecho o falta de aire.    Dolor de senia fadi.    Dolor punzante o zumbido en los oídos.    Hemorragia nasal.    Dolor abdominal fadi y repentino.    Señales de un ataque cerebral:    Debilidad en un brazo o pierna, o en un lado de la marco.    Dificultades para hablar o nilson.    Gran somnolencia, confusión, mareo o desmayos.    [id number] [employer]. [address] [place] [address], [place] [address]. Todos los derechos reservados. Esta información no pretende sustituir la atención médica profesional. Sólo lopez médico puede diagnosticar y tratar un problema de alida.

## 2021-02-23 NOTE — LETTER
February 25, 2021      Charity PALMER  [address] [place] [address]        Ms. Brant Palmer,   Your lab results were normal. Please let us know if you have any questions.   Thank you for allowing us to participate in your care.   CHELSY Santiago CNP     Resulted Orders   Basic metabolic panel  (Ca, Cl, CO2, Creat, Gluc, K, Na, BUN)   Result Value Ref Range    Sodium 139 133 - 144 mmol/L    Potassium 3.7 3.4 - 5.3 mmol/L    Chloride 109 94 - 109 mmol/L    Carbon Dioxide 27 20 - 32 mmol/L    Anion Gap 3 3 - 14 mmol/L    Glucose 106 (H) 70 - 99 mg/dL    Urea Nitrogen 12 7 - 30 mg/dL    Creatinine 0.56 0.52 - 1.04 mg/dL    GFR Estimate >90 >60 mL/min/[1.73_m2]      Comment:      Non  GFR Calc  Starting 12/18/2018, serum creatinine based estimated GFR (eGFR) will be   calculated using the Chronic Kidney Disease Epidemiology Collaboration   (CKD-EPI) equation.      GFR Estimate If Black >90 >60 mL/min/[1.73_m2]      Comment:       GFR Calc  Starting 12/18/2018, serum creatinine based estimated GFR (eGFR) will be   calculated using the Chronic Kidney Disease Epidemiology Collaboration   (CKD-EPI) equation.      Calcium 9.2 8.5 - 10.1 mg/dL

## 2021-02-24 NOTE — RESULT ENCOUNTER NOTE
Please send letter:    Ms. Brant العلي,  Your lab results were normal. Please let us know if you have any questions.  Thank you for allowing us to participate in your care.  CHELSY Santiago CNP

## 2021-03-09 ENCOUNTER — OFFICE VISIT (OUTPATIENT)
Dept: FAMILY MEDICINE | Facility: CLINIC | Age: 46
End: 2021-03-09
Payer: COMMERCIAL

## 2021-03-09 VITALS
BODY MASS INDEX: 39.19 KG/M2 | DIASTOLIC BLOOD PRESSURE: 79 MMHG | OXYGEN SATURATION: 97 % | HEIGHT: 63 IN | WEIGHT: 221.2 LBS | HEART RATE: 82 BPM | SYSTOLIC BLOOD PRESSURE: 120 MMHG | TEMPERATURE: 98.3 F

## 2021-03-09 DIAGNOSIS — L28.0 LICHEN SIMPLEX CHRONICUS: ICD-10-CM

## 2021-03-09 DIAGNOSIS — R03.0 ELEVATED BLOOD PRESSURE READING WITHOUT DIAGNOSIS OF HYPERTENSION: Primary | ICD-10-CM

## 2021-03-09 PROCEDURE — 99213 OFFICE O/P EST LOW 20 MIN: CPT | Performed by: NURSE PRACTITIONER

## 2021-03-09 RX ORDER — NIFEDIPINE 30 MG/1
30 TABLET, EXTENDED RELEASE ORAL DAILY
COMMUNITY
Start: 2021-02-25 | End: 2021-03-09

## 2021-03-09 RX ORDER — TRIAMCINOLONE ACETONIDE 5 MG/G
OINTMENT TOPICAL
Qty: 15 G | Refills: 1 | Status: SHIPPED | OUTPATIENT
Start: 2021-03-09

## 2021-03-09 ASSESSMENT — PAIN SCALES - GENERAL: PAINLEVEL: NO PAIN (0)

## 2021-03-09 ASSESSMENT — MIFFLIN-ST. JEOR: SCORE: 1627.49

## 2021-03-09 NOTE — PATIENT INSTRUCTIONS
At [facility], we strive to deliver an exceptional experience to you, every time we see you. If you receive a survey, please complete it as we do value your feedback.  If you have MyChart, you can expect to receive results automatically within 24 hours of their completion.  Your provider will send a note interpreting your results as well.   If you do not have MyChart, you should receive your results in about a week by mail.    Your care team:                            Family Medicine Internal Medicine   MD Ramakrishna Patton MD Shantel Branch-Fleming, MD Srinivasa Vaka, MD Katya Belousova, PAJULIETH Peace, APRN CNP    Alexys Mcnair, MD Pediatrics   Jese Krueger, PAJULIETH Underwood, CNP MD Kavita Arcos APRN CNP   MD Celia Izquierdo MD Deborah Mielke, MD Eli Ya, APRN [facility]      Clinic hours: Monday - Thursday 7 am-6 pm; Fridays 7 am-5 pm.   Urgent care: Monday - Friday 11 am-9 pm; Saturday and Sunday 9 am-5 pm.    Clinic: [phone]       [facility]: Monday - Thursday 8 am - 7 pm; Friday 8 am - 6 pm  [facility]: [phone]     Use www.[email or web address].org for 24/7 diagnosis and treatment of dozens of conditions.

## 2021-03-09 NOTE — PROGRESS NOTES
Assessment & Plan     Elevated blood pressure reading without diagnosis of hypertension  BP 120s/80s. Not currently taking niffedipine. BP is controlled. I think it's ok to stop medication (officially) and work on lifestyle, diet/exercise. Continue to monitor BP and follow up if >140/90.    Lichen simplex chronicus  Refilled.  - triamcinolone (KENALOG) 0.5 % external ointment; Apply sparingly to affected area twice daily for two weeks, then twice per week as needed.       See Patient Instructions    Return in about 6 months (around 9/9/2021) for BP Recheck.    CHELSY Santiago CNP  [facility]    Lupe Nash is a 45 year old who presents for the following health issues  accompanied by phone :    HPI       Hypertension Follow Up      Do you check your blood pressure regularly outside of the clinic? Yes     Are you following a low salt diet? No    Are your blood pressures ever more than 140 on the top number (systolic) OR more   than 90 on the bottom number (diastolic), for example 140/90? No      How many servings of fruits and vegetables do you eat daily?  0-1    On average, how many sweetened beverages do you drink each day (Examples: soda, juice, sweet tea, etc.  Do NOT count diet or artificially sweetened beverages)?   0    How many days per week do you exercise enough to make your heart beat faster? 3 or less    How many minutes a day do you exercise enough to make your heart beat faster? 30 - 60  How many days per week do you miss taking your medication? 1    What makes it hard for you to take your medications?  remembering to take    Not really taking BP medication  No chest pain, shortness of breath, swelling, headaches, palpitations, lightheadedness/dizziness    Needs refill of triamcinolone cream. Doesn't need it currently but wants to have it on hand    Review of Systems   Constitutional, HEENT, cardiovascular, pulmonary, gi and gu systems are negative,  "except as otherwise noted.      Objective    /79 (BP Location: Left arm, Patient Position: Sitting, Cuff Size: Adult Large)   Pulse 82   Temp 98.3  F (36.8  C) (Oral)   Ht 1.6 m (5' 3\")   Wt 100.3 kg (221 lb 3.2 oz)   SpO2 97%   BMI 39.18 kg/m    Body mass index is 39.18 kg/m .  Physical Exam   GENERAL: healthy, alert and no distress  RESP: lungs clear to auscultation - no rales, rhonchi or wheezes  CV: regular rate and rhythm, normal S1 S2, no S3 or S4, no murmur, click or rub, no peripheral edema and peripheral pulses strong  MS: no gross musculoskeletal defects noted, no edema  PSYCH: mentation appears normal, affect normal/bright                "

## 2021-04-23 ENCOUNTER — TELEPHONE (OUTPATIENT)
Dept: FAMILY MEDICINE | Facility: CLINIC | Age: 46
End: 2021-04-23

## 2021-04-23 ENCOUNTER — IMMUNIZATION (OUTPATIENT)
Dept: PEDIATRICS | Facility: CLINIC | Age: 46
End: 2021-04-23
Payer: COMMERCIAL

## 2021-04-23 PROCEDURE — 0001A PR COVID VAC PFIZER DIL RECON 30 MCG/0.3 ML IM: CPT

## 2021-04-23 PROCEDURE — 91300 PR COVID VAC PFIZER DIL RECON 30 MCG/0.3 ML IM: CPT

## 2021-04-23 NOTE — TELEPHONE ENCOUNTER
"This writer attempted to contact patient via  on 04/23/21      Reason for call refill request and unable to leave message as VM box not yet set up      If patient calls back:   Registered Nurse called. Follow Triage Call workflow- need to know if she is still taking this med or if she restarted this after last visit    Per 3/9/2021 OV notes-    \"BP 120s/80s. Not currently taking niffedipine. BP is controlled. I think it's ok to stop medication (officially) and work on lifestyle, diet/exercise. Continue to monitor BP and follow up if >140/90\"    Queta Arauz RN    "

## 2021-04-23 NOTE — TELEPHONE ENCOUNTER
Called patient via  service-    Patient verified that she is no longer taking this med    Med possibly on auto refill at the pharmacy?  Called [facility] pharmacy and spoke with Caryl  Advised her to discontinue the Nifedipine   She verbalized understanding      Queta Arauz RN  [facility]

## 2021-04-25 ENCOUNTER — HEALTH MAINTENANCE LETTER (OUTPATIENT)
Age: 46
End: 2021-04-25

## 2021-05-21 ENCOUNTER — IMMUNIZATION (OUTPATIENT)
Dept: PEDIATRICS | Facility: CLINIC | Age: 46
End: 2021-05-21
Attending: INTERNAL MEDICINE
Payer: COMMERCIAL

## 2021-05-21 PROCEDURE — 91300 PR COVID VAC PFIZER DIL RECON 30 MCG/0.3 ML IM: CPT

## 2021-05-21 PROCEDURE — 0002A PR COVID VAC PFIZER DIL RECON 30 MCG/0.3 ML IM: CPT

## 2021-10-10 ENCOUNTER — HEALTH MAINTENANCE LETTER (OUTPATIENT)
Age: 46
End: 2021-10-10

## 2021-12-04 ENCOUNTER — HEALTH MAINTENANCE LETTER (OUTPATIENT)
Age: 46
End: 2021-12-04

## 2022-04-25 ENCOUNTER — OFFICE VISIT (OUTPATIENT)
Dept: FAMILY MEDICINE | Facility: CLINIC | Age: 47
End: 2022-04-25
Payer: COMMERCIAL

## 2022-04-25 VITALS
HEIGHT: 63 IN | HEART RATE: 76 BPM | DIASTOLIC BLOOD PRESSURE: 86 MMHG | RESPIRATION RATE: 18 BRPM | WEIGHT: 220 LBS | OXYGEN SATURATION: 99 % | TEMPERATURE: 97.9 F | SYSTOLIC BLOOD PRESSURE: 132 MMHG | BODY MASS INDEX: 38.98 KG/M2

## 2022-04-25 DIAGNOSIS — Z13.1 SCREENING FOR DIABETES MELLITUS: ICD-10-CM

## 2022-04-25 DIAGNOSIS — Z00.00 ROUTINE GENERAL MEDICAL EXAMINATION AT A HEALTH CARE FACILITY: Primary | ICD-10-CM

## 2022-04-25 DIAGNOSIS — Z12.31 VISIT FOR SCREENING MAMMOGRAM: ICD-10-CM

## 2022-04-25 DIAGNOSIS — R10.30 LOWER ABDOMINAL PAIN: ICD-10-CM

## 2022-04-25 DIAGNOSIS — R10.30 LOWER ABDOMINAL PAIN: Primary | ICD-10-CM

## 2022-04-25 DIAGNOSIS — E05.90 SUBCLINICAL HYPERTHYROIDISM: ICD-10-CM

## 2022-04-25 DIAGNOSIS — Z13.6 CARDIOVASCULAR SCREENING; LDL GOAL LESS THAN 160: ICD-10-CM

## 2022-04-25 DIAGNOSIS — Z12.11 SPECIAL SCREENING FOR MALIGNANT NEOPLASMS, COLON: ICD-10-CM

## 2022-04-25 LAB
ALBUMIN UR-MCNC: NEGATIVE MG/DL
ANION GAP SERPL CALCULATED.3IONS-SCNC: 8 MMOL/L (ref 3–14)
APPEARANCE UR: CLEAR
BACTERIA #/AREA URNS HPF: ABNORMAL /HPF
BILIRUB UR QL STRIP: NEGATIVE
BUN SERPL-MCNC: 12 MG/DL (ref 7–30)
CALCIUM SERPL-MCNC: 9.2 MG/DL (ref 8.5–10.1)
CHLORIDE BLD-SCNC: 108 MMOL/L (ref 94–109)
CHOLEST SERPL-MCNC: 100 MG/DL
CO2 SERPL-SCNC: 24 MMOL/L (ref 20–32)
COLOR UR AUTO: YELLOW
CREAT SERPL-MCNC: 0.48 MG/DL (ref 0.52–1.04)
ERYTHROCYTE [DISTWIDTH] IN BLOOD BY AUTOMATED COUNT: 12.9 % (ref 10–15)
FASTING STATUS PATIENT QL REPORTED: NO
GFR SERPL CREATININE-BSD FRML MDRD: >90 ML/MIN/1.73M2
GLUCOSE BLD-MCNC: 89 MG/DL (ref 70–99)
GLUCOSE UR STRIP-MCNC: NEGATIVE MG/DL
HCT VFR BLD AUTO: 40.5 % (ref 35–47)
HDLC SERPL-MCNC: 40 MG/DL
HGB BLD-MCNC: 13 G/DL (ref 11.7–15.7)
HGB UR QL STRIP: NEGATIVE
KETONES UR STRIP-MCNC: NEGATIVE MG/DL
LDLC SERPL CALC-MCNC: 53 MG/DL
LEUKOCYTE ESTERASE UR QL STRIP: ABNORMAL
MCH RBC QN AUTO: 28.8 PG (ref 26.5–33)
MCHC RBC AUTO-ENTMCNC: 32.1 G/DL (ref 31.5–36.5)
MCV RBC AUTO: 90 FL (ref 78–100)
NITRATE UR QL: NEGATIVE
NONHDLC SERPL-MCNC: 60 MG/DL
PH UR STRIP: 7 [PH] (ref 5–7)
PLATELET # BLD AUTO: 264 10E3/UL (ref 150–450)
POTASSIUM BLD-SCNC: 4 MMOL/L (ref 3.4–5.3)
RBC # BLD AUTO: 4.52 10E6/UL (ref 3.8–5.2)
RBC #/AREA URNS AUTO: ABNORMAL /HPF
SODIUM SERPL-SCNC: 140 MMOL/L (ref 133–144)
SP GR UR STRIP: 1.01 (ref 1–1.03)
SQUAMOUS #/AREA URNS AUTO: ABNORMAL /LPF
TRIGL SERPL-MCNC: 36 MG/DL
TSH SERPL DL<=0.005 MIU/L-ACNC: 0.91 MU/L (ref 0.4–4)
UROBILINOGEN UR STRIP-ACNC: 0.2 E.U./DL
WBC # BLD AUTO: 6.2 10E3/UL (ref 4–11)
WBC #/AREA URNS AUTO: ABNORMAL /HPF

## 2022-04-25 PROCEDURE — 80061 LIPID PANEL: CPT | Performed by: NURSE PRACTITIONER

## 2022-04-25 PROCEDURE — 81001 URINALYSIS AUTO W/SCOPE: CPT | Performed by: NURSE PRACTITIONER

## 2022-04-25 PROCEDURE — 80048 BASIC METABOLIC PNL TOTAL CA: CPT | Performed by: NURSE PRACTITIONER

## 2022-04-25 PROCEDURE — 84443 ASSAY THYROID STIM HORMONE: CPT | Performed by: NURSE PRACTITIONER

## 2022-04-25 PROCEDURE — 85027 COMPLETE CBC AUTOMATED: CPT | Performed by: NURSE PRACTITIONER

## 2022-04-25 PROCEDURE — 99396 PREV VISIT EST AGE 40-64: CPT | Performed by: NURSE PRACTITIONER

## 2022-04-25 PROCEDURE — 36415 COLL VENOUS BLD VENIPUNCTURE: CPT | Performed by: NURSE PRACTITIONER

## 2022-04-25 PROCEDURE — 87086 URINE CULTURE/COLONY COUNT: CPT | Performed by: NURSE PRACTITIONER

## 2022-04-25 ASSESSMENT — ENCOUNTER SYMPTOMS
FREQUENCY: 0
FEVER: 0
ARTHRALGIAS: 0
COUGH: 0
MYALGIAS: 0
PALPITATIONS: 0
BREAST MASS: 0
NAUSEA: 0
EYE PAIN: 0
WEAKNESS: 0
CHILLS: 0
DYSURIA: 0
HEADACHES: 0
HEMATOCHEZIA: 0
JOINT SWELLING: 0
DIZZINESS: 0
NERVOUS/ANXIOUS: 0
DIARRHEA: 0
CONSTIPATION: 1
PARESTHESIAS: 0
HEMATURIA: 0
SORE THROAT: 0
ABDOMINAL PAIN: 1
HEARTBURN: 0
SHORTNESS OF BREATH: 0

## 2022-04-25 ASSESSMENT — PAIN SCALES - GENERAL: PAINLEVEL: NO PAIN (0)

## 2022-04-25 NOTE — PROGRESS NOTES
SUBJECTIVE:   CC: Charity العلي is an 46 year old woman who presents for preventive health visit.       Patient has been advised of split billing requirements and indicates understanding: Yes  Healthy Habits:     Getting at least 3 servings of Calcium per day:  NO    Bi-annual eye exam:  NO    Dental care twice a year:  NO    Sleep apnea or symptoms of sleep apnea:  None    Diet:  Regular (no restrictions) and Other    Frequency of exercise:  None    Taking medications regularly:  Yes    Medication side effects:  None    PHQ-2 Total Score: 0    Additional concerns today:  No        LLQ pain and suprapubic pain  Comes and goes for the last week  No concern for STI  BMs - BID - current baseline. Used to go every 2-3 days  No fever  No nausea or vomiting  No dysuria  Some urgency and frequency  No constipation or diarrhea      Today's PHQ-2 Score:   PHQ-2 ( 1999 [employer]) 4/25/2022   Q1: Little interest or pleasure in doing things 0   Q2: Feeling down, depressed or hopeless 0   PHQ-2 Score 0   PHQ-2 Total Score (12-17 Years)- Positive if 3 or more points; Administer PHQ-A if positive -   Q1: Little interest or pleasure in doing things Not at all   Q2: Feeling down, depressed or hopeless Not at all   PHQ-2 Score 0       Abuse: Current or Past (Physical, Sexual or Emotional) - No  Do you feel safe in your environment? Yes    Have you ever done Advance Care Planning? (For example, a Health Directive, POLST, or a discussion with a medical provider or your loved ones about your wishes): No, advance care planning information given to patient to review.  Patient declined advance care planning discussion at this time.    Social History     Tobacco Use     Smoking status: Never Smoker     Smokeless tobacco: Never Used   Substance Use Topics     Alcohol use: No     If you drink alcohol do you typically have >3 drinks per day or >7 drinks per week? No    Alcohol Use 4/25/2022   Prescreen: >3 drinks/day or >7  drinks/week? No   Prescreen: >3 drinks/day or >7 drinks/week? -       Reviewed orders with patient.  Reviewed health maintenance and updated orders accordingly - Yes  Lab work is in process  Labs reviewed in EPIC  BP Readings from Last 3 Encounters:   22 132/86   21 120/79   21 127/87    Wt Readings from Last 3 Encounters:   22 99.8 kg (220 lb)   21 100.3 kg (221 lb 3.2 oz)   21 99.8 kg (220 lb)                  Patient Active Problem List   Diagnosis     Elevated blood pressure reading without diagnosis of hypertension     History of spontaneous      Monocular diplopia of left eye     Subclinical hyperthyroidism     IUD (intrauterine device) in place     History reviewed. No pertinent surgical history.    Social History     Tobacco Use     Smoking status: Never Smoker     Smokeless tobacco: Never Used   Substance Use Topics     Alcohol use: No     Family History   Problem Relation Age of Onset     Hypertension Mother      Myocardial Infarction Mother 62     Coronary Artery Disease Mother          Current Outpatient Medications   Medication Sig Dispense Refill     triamcinolone (KENALOG) 0.5 % external ointment Apply sparingly to affected area twice daily for two weeks, then twice per week as needed. (Patient not taking: Reported on 2022) 15 g 1     No Known Allergies    Breast Cancer Screening:    Breast CA Risk Assessment (FHS-7) 2022   Do you have a family history of breast, colon, or ovarian cancer? No / Unknown         Mammogram Screening - Offered annual screening and updated Health Maintenance for mutual plan based on risk factor consideration    Pertinent mammograms are reviewed under the imaging tab.    History of abnormal Pap smear: NO - age 30-65 PAP every 5 years with negative HPV co-testing recommended  PAP / HPV Latest Ref Rng & Units 8/3/2018   PAP (Historical) - NIL   HPV16 NEG:Negative Negative   HPV18 NEG:Negative Negative   HRHPV NEG:Negative  "Negative     Reviewed and updated as needed this visit by clinical staff   Tobacco  Allergies  Meds   Med Hx  Surg Hx  Fam Hx  Soc Hx          Reviewed and updated as needed this visit by Provider                   Past Medical History:   Diagnosis Date     History of gestational diabetes       History reviewed. No pertinent surgical history.    Review of Systems   Constitutional: Negative for chills and fever.   HENT: Negative for congestion, ear pain, hearing loss and sore throat.    Eyes: Positive for visual disturbance. Negative for pain.   Respiratory: Negative for cough and shortness of breath.    Cardiovascular: Negative for chest pain, palpitations and peripheral edema.   Gastrointestinal: Positive for abdominal pain and constipation. Negative for diarrhea, heartburn, hematochezia and nausea.   Breasts:  Negative for tenderness, breast mass and discharge.   Genitourinary: Negative for dysuria, frequency, genital sores, hematuria, pelvic pain, urgency, vaginal bleeding and vaginal discharge.   Musculoskeletal: Negative for arthralgias, joint swelling and myalgias.   Skin: Negative for rash.   Neurological: Negative for dizziness, weakness, headaches and paresthesias.   Psychiatric/Behavioral: Negative for mood changes. The patient is not nervous/anxious.           OBJECTIVE:   /86   Pulse 76   Temp 97.9  F (36.6  C) (Tympanic)   Resp 18   Ht 1.6 m (5' 2.99\")   Wt 99.8 kg (220 lb)   SpO2 99%   BMI 38.98 kg/m    Physical Exam  GENERAL: healthy, alert and no distress  EYES: Eyes grossly normal to inspection, PERRL and conjunctivae and sclerae normal  HENT: ear canals and TM's normal, nose and mouth without ulcers or lesions  NECK: no adenopathy, no asymmetry, masses, or scars and thyroid normal to palpation  RESP: lungs clear to auscultation - no rales, rhonchi or wheezes  BREAST: normal without masses, tenderness or nipple discharge and no palpable axillary masses or adenopathy  CV: regular " rate and rhythm, normal S1 S2, no S3 or S4, no murmur, click or rub, no peripheral edema and peripheral pulses strong  ABDOMEN: soft, nontender, no hepatosplenomegaly, no masses and bowel sounds normal  MS: no gross musculoskeletal defects noted, no edema  SKIN: no suspicious lesions or rashes  NEURO: Normal strength and tone, mentation intact and speech normal  PSYCH: mentation appears normal, affect normal/bright    Diagnostic Test Results:  Labs reviewed in Epic  No results found for this or any previous visit (from the past 24 hour(s)).    ASSESSMENT/PLAN:   (Z00.00) Routine general medical examination at a health care facility  (primary encounter diagnosis)  Plan: REVIEW OF HEALTH MAINTENANCE PROTOCOL ORDERS,         Basic metabolic panel  (Ca, Cl, CO2, Creat,         Gluc, K, Na, BUN)            (R10.30) Lower abdominal pain  Comment: will get labs. nontender on exam. Constipation?  Plan: UA Macro with Reflex to Micro and Culture - lab        collect, CBC with platelets            (E05.90) Subclinical hyperthyroidism  Comment: rechecking.  Plan: TSH with free T4 reflex            (Z13.6) CARDIOVASCULAR SCREENING; LDL GOAL LESS THAN 160  Comment: fasting  Plan: Lipid panel reflex to direct LDL Fasting          (Z13.1) Screening for diabetes mellitus  Comment: fasting  Plan: Basic metabolic panel  (Ca, Cl, CO2, Creat,         Gluc, K, Na, BUN)            (Z12.31) Visit for screening mammogram  Plan: *MA Screening Digital Bilateral            (Z12.11) Special screening for malignant neoplasms, colon  Comment: to schedule after 45th birthday  Plan: Adult Gastro Ref - Procedure Only              Patient has been advised of split billing requirements and indicates understanding: Yes    COUNSELING:  Reviewed preventive health counseling, as reflected in patient instructions       Regular exercise       Healthy diet/nutrition       Colorectal Cancer Screening       Consider Hep C screening for all patients one time  "for ages 18-79 years    Estimated body mass index is 38.98 kg/m  as calculated from the following:    Height as of this encounter: 1.6 m (5' 2.99\").    Weight as of this encounter: 99.8 kg (220 lb).    Weight management plan: Discussed healthy diet and exercise guidelines    She reports that she has never smoked. She has never used smokeless tobacco.      Counseling Resources:  ATP IV Guidelines  Pooled Cohorts Equation Calculator  Breast Cancer Risk Calculator  BRCA-Related Cancer Risk Assessment: FHS-7 Tool  FRAX Risk Assessment  ICSI Preventive Guidelines  Dietary Guidelines for Americans, 2010  USDA's MyPlate  ASA Prophylaxis  Lung CA Screening    CHELSY Santiago CNP  [facility]  "

## 2022-04-25 NOTE — PATIENT INSTRUCTIONS
To schedule your imaging exam at any of the [facility] imaging locations, please call [phone]      Patient Education         Preventive Health Recommendations  Female Ages 40 to 49    Yearly exam:   See your health care provider every year in order to  Review health changes.   Discuss preventive care.    Review your medicines if your doctor prescribed any.    Get a Pap test every three years (unless you have an abnormal result and your provider advises testing more often).    If you get Pap tests with HPV test, you only need to test every 5 years, unless you have an abnormal result. You do not need a Pap test if your uterus was removed (hysterectomy) and you have not had cancer.    You should be tested each year for STDs (sexually transmitted diseases), if you're at risk.   Ask your doctor if you should have a mammogram.    Have a colonoscopy (test for colon cancer) if someone in your family has had colon cancer or polyps before age 50.     Have a cholesterol test every 5 years.     Have a diabetes test (fasting glucose) after age 45. If you are at risk for diabetes, you should have this test every 3 years.    Shots: Get a flu shot each year. Get a tetanus shot every 10 years.     Nutrition:   Eat at least 5 servings of fruits and vegetables each day.  Eat whole-grain bread, whole-wheat pasta and brown rice instead of white grains and rice.  Get adequate Calcium and Vitamin D.      Lifestyle  Exercise at least 150 minutes a week (an average of 30 minutes a day, 5 days a week). This will help you control your weight and prevent disease.  Limit alcohol to one drink per day.  No smoking.   Wear sunscreen to prevent skin cancer.  See your dentist every six months for an exam and cleaning.               [de-identified] : Medication risks reviewed. Surgical risks reviewed. 77 y/o F with bone on bone medial compartmental osteoarthritis of the right knee and near bone on bone medial compartmental osteoarthritis of the left knee. Conservative therapy and surgical options discussed in detail with the patient. Based on imaging, she is a candidate for a right TKA and a future candidate for a left TKA. The patient had  thoracic surgery for a lung nodule, so she is not interested in pursuing surgery at this time. She is still getting some relief with cortisone injections and opted for repeat bilateral knee cortisone injections today which she tolerated well. F/u with us in three months for repeat cortisone injections if needed. \par \par The patient is a 76 year individual with end stage arthritis of their right knee joint. Based upon the patient's continued symptoms and failure to respond to conservative treatment I have recommended a right total knee arthroplasty for this patient. A long discussion took place with the patient describing what a total joint replacement is and what the procedure would entail. A total knee arthroplasty model, similar to the implant that was used during the operation, was utilized to demonstrate and to discuss the various bearing surfaces of the implants. The hospitalization and post-operative care and rehabilitation were also discussed. The use of perioperative antibiotics and DVT prophylaxis were discussed. The risk, benefits and alternatives to a surgical intervention were discussed at length with the patient. The patient was also advised of risks related to the medical comorbidities, elevated body mass index (BMI), and smoking where applicable. We discussed how to reduce modifiable risk factors and encouraged smoking cessation were applicable.. A lengthy discussion took place to review the most common complications including but not limited to: deep vein thrombosis, pulmonary embolus, heart attack, stroke, infection, wound breakdown, numbness, damage to nerves, tendon, muscles, arteries or other blood vessels, death and other possible complications from anesthesia. The patient was told that we will take steps to minimize these risks by using sterile technique, antibiotics and DVT prophylaxis when appropriate and follow the patient postoperatively in the office setting to monitor progress. The possibility of recurrent pain, no improvement in pain and actual worsening of pain were also discussed with the patient.\par The discharge plan of care focused on the patient going home following surgery. The patient was encouraged to make the necessary arrangements to have someone stay with them when they are discharged home. Following discharge, a home care nurse was to the patient. The home care nurse would open the patient’s home care case and request home physical therapy services. Home physical therapy was to commence following discharge provided it was appropriate and covered by the health insurance benefit plan. \par The benefits of surgery were discussed with the patient including the potential for improving her current clinical condition through operative intervention. Alternatives to surgical intervention including continued conservative management were also discussed in detail. All questions were answered to the satisfaction of the patient. The treatment plan of care, as well as a model of a total knee arthroplasty equivalent to the one that will be used for their total joint replacement, was shared with the patient. The patient agreed to the plan of care as well as the use of implants in their total joint replacement. \par  \par

## 2022-04-27 ENCOUNTER — TELEPHONE (OUTPATIENT)
Dept: GASTROENTEROLOGY | Facility: CLINIC | Age: 47
End: 2022-04-27
Payer: COMMERCIAL

## 2022-04-27 LAB — BACTERIA UR CULT: NO GROWTH

## 2022-04-27 NOTE — TELEPHONE ENCOUNTER
Screening Questions  BlueKIND OF PREP RedLOCATION [review exclusion criteria] GreenSEDATION TYPE  1. Have you had a positive covid test in the last 90 days? N     2. Do you have a legal guardian or medical Power of ?  Are you able to give consent for your medical care?Yes (Sedation review/consideration needed)    3. Are you active on mychart? Yes    4. What insurance is in the chart?  HP/Cigna    3.   Ordering/Referring Provider: Kobi    4. BMI 38.9 [BMI OVER 40-EXTENDED PREP]  If greater than 40 review exclusion criteria [PAC APPT IF @ [facility]]        5.  Respiratory Screening :  [If yes to any of the following HOSPITAL setting only]     Do you use daily home oxygen? N    Do you have mod to severe Obstructive Sleep Apnea? N  [OKAY @ [facility] SH PH RI]   Do you have Pulmonary Hypertension?   N   Do you have UNCONTROLLED asthma?   N      6.   Have you had a heart or lung transplant?   N    7.   Are you currently on dialysis? N  [ If yes, G-PREP & HOSPITAL setting only]     8.   Do you have chronic kidney disease?N  [ If yes, G-PREP ]    9.   Have you had a stroke or Transient ischemic attack (TIA - aka  mini stroke ) within 6 months?  N (If yes, please review exclusion criteria)    10.   In the past 6 months, have you had any heart related issues including cardiomyopathy or heart attack?  N          If yes, did it require cardiac stenting or other implantable device?   N    11.   Do you have any implantable devices in your body (pacemaker, defib, LVAD)? N (If yes, please review exclusion criteria)    12.   Do you take nitroglycerin? N           If yes, how often? NA   (if yes, HOSPITAL setting ONLY)    13.   Are you currently taking any blood thinners?  N          [IF YES, INFORM PATIENT TO FOLLOW UP W/ ORDERING PROVIDER FOR BRIDGING INSTRUCTIONS]     14.   Do you have a diagnosis of diabetes? N   [ If yes, G-PREP ]    15.   [FEMALES] Are you currently pregnant?   NA  If yes, how many weeks? NA    16.    Are you taking any prescription pain medications on a routine schedule? N   [ If yes, EXTENDED PREP.] [If yes, MAC]    17.   Do you have any chemical dependencies such as alcohol, street drugs, or methadone?  N [If yes, MAC]    18.   Do you have any history of post-traumatic stress syndrome, severe anxiety or history of psychosis? N   [If yes, MAC]    19.   Do you transfer independently?  Yes    20.  On a regular basis do you go 3-5 days between bowel movements?  N [ If yes, EXTENDED PREP.]    21.   Preferred LOCAL Pharmacy for Pre Prescription          [id number] - [facility], [place] - [address]      Scheduling Details      Caller : Charity with   (Please ask for phone number if not scheduled by patient)    Type of Procedure Scheduled: Colon  Which Colonoscopy Prep was Sent?: Miralax  [facility]S CF PATIENTS & [facility]S PATIENTS NEEDS EXTENDED PREP  Surgeon: Arsenio  Date of Procedure: 6/24/22  Location:       Sedation Type: CS  Conscious Sedation- Needs  for 6 hours after the procedure  MAC/General-Needs  for 24 hours after procedure    Pre-op Required at [facility], [place], [place] and OR for MAC sedation: NA  (advise patient they will need a pre-op prior to procedure -)      Informed patient they will need an adult  Yes  Cannot take any type of public or medical transportation alone    Pre-Procedure Covid test to be completed at [facility] or Externally: Rachelle Mclean 6/20/22    Confirmed Nurse will call to complete assessment Yes    Additional comments: ON THE PHONE WITH  AND THE PATIENT MUST HAVE GOTTEN DISTRACTED WITH SOMEHTING ELSE. SHE DID NOT ANSWER AFTER FIRST QUESTION AND DID NOT PICK WHEN CALLING BACK

## 2022-05-02 ENCOUNTER — HOSPITAL ENCOUNTER (OUTPATIENT)
Facility: AMBULATORY SURGERY CENTER | Age: 47
End: 2022-05-02
Attending: INTERNAL MEDICINE
Payer: COMMERCIAL

## 2022-05-21 ENCOUNTER — HEALTH MAINTENANCE LETTER (OUTPATIENT)
Age: 47
End: 2022-05-21

## 2022-05-25 ENCOUNTER — TELEPHONE (OUTPATIENT)
Dept: GASTROENTEROLOGY | Facility: CLINIC | Age: 47
End: 2022-05-25
Payer: COMMERCIAL

## 2022-05-25 NOTE — TELEPHONE ENCOUNTER
Caller: Charity العلي    Procedure: colonoscopy    Date, Location, and Surgeon of Procedure Cancelled: 624/2022,[facility]    Ordering Provider:Chelsi Peace APRN CNP    Reason for cancel (please be detailed, any staff messages or encounters to note?): pt's insurance did not cover procedure        Rescheduled: pt will call back when able to get the procedure covered     If rescheduled:    Date:    Location:    Prep Resent: (changes to prep?)   Covid Test Rescheduled:    Note any change or update to original order/sedation:

## 2022-07-23 ENCOUNTER — ANCILLARY PROCEDURE (OUTPATIENT)
Dept: MAMMOGRAPHY | Facility: CLINIC | Age: 47
End: 2022-07-23
Attending: NURSE PRACTITIONER
Payer: COMMERCIAL

## 2022-07-23 DIAGNOSIS — Z12.31 VISIT FOR SCREENING MAMMOGRAM: ICD-10-CM

## 2022-07-23 PROCEDURE — 77067 SCR MAMMO BI INCL CAD: CPT | Mod: TC | Performed by: RADIOLOGY

## 2022-07-23 PROCEDURE — 77063 BREAST TOMOSYNTHESIS BI: CPT | Mod: TC | Performed by: RADIOLOGY

## 2022-09-18 ENCOUNTER — HEALTH MAINTENANCE LETTER (OUTPATIENT)
Age: 47
End: 2022-09-18

## 2023-04-11 NOTE — TELEPHONE ENCOUNTER
DIAGNOSIS: left knee cyst behind knee /   APPOINTMENT DATE: 04/24/2023   NOTES STATUS DETAILS   OFFICE NOTE from referring provider Care Everywhere 04/04/2023 [facility]    OFFICE NOTE from other specialist N/A    DISCHARGE SUMMARY from hospital N/A    DISCHARGE REPORT from the ER N/A    OPERATIVE REPORT N/A    EMG report N/A    MEDICATION LIST N/A    MRI N/A    ULTRASOUND Received 04/04/2023 LFT lower extremity    CT SCAN N/A    XRAYS (IMAGES & REPORTS) N/A      Images in PACS

## 2023-04-24 ENCOUNTER — PRE VISIT (OUTPATIENT)
Dept: ORTHOPEDICS | Facility: CLINIC | Age: 48
End: 2023-04-24

## 2023-06-04 ENCOUNTER — HEALTH MAINTENANCE LETTER (OUTPATIENT)
Age: 48
End: 2023-06-04

## 2023-10-08 ENCOUNTER — HEALTH MAINTENANCE LETTER (OUTPATIENT)
Age: 48
End: 2023-10-08

## 2024-07-14 ENCOUNTER — HEALTH MAINTENANCE LETTER (OUTPATIENT)
Age: 49
End: 2024-07-14

## 2024-12-01 ENCOUNTER — HEALTH MAINTENANCE LETTER (OUTPATIENT)
Age: 49
End: 2024-12-01

## 2025-07-19 ENCOUNTER — HEALTH MAINTENANCE LETTER (OUTPATIENT)
Age: 50
End: 2025-07-19